# Patient Record
Sex: FEMALE | Race: BLACK OR AFRICAN AMERICAN | Employment: UNEMPLOYED | URBAN - METROPOLITAN AREA
[De-identification: names, ages, dates, MRNs, and addresses within clinical notes are randomized per-mention and may not be internally consistent; named-entity substitution may affect disease eponyms.]

---

## 2020-04-14 ENCOUNTER — TELEPHONE (OUTPATIENT)
Dept: OBGYN | Age: 30
End: 2020-04-14

## 2020-04-14 NOTE — TELEPHONE ENCOUNTER
Patient called requesting new OB appointment. Reviewed poc, COVID-19 restrictions, no show policy and directions with patient verbalizing understanding. Instructed to bring picture ID and proof of income. Referral from [facility].

## 2020-05-05 ENCOUNTER — INITIAL PRENATAL (OUTPATIENT)
Dept: OBGYN | Age: 30
End: 2020-05-05
Payer: MEDICAID

## 2020-05-05 VITALS — HEART RATE: 85 BPM | WEIGHT: 228.4 LBS | SYSTOLIC BLOOD PRESSURE: 118 MMHG | DIASTOLIC BLOOD PRESSURE: 81 MMHG

## 2020-05-05 PROBLEM — O99.210 OBESITY IN PREGNANCY, ANTEPARTUM: Status: ACTIVE | Noted: 2020-05-05

## 2020-05-05 PROBLEM — Z86.19 H/O SYPHILIS: Status: ACTIVE | Noted: 2020-05-05

## 2020-05-05 PROBLEM — O09.30 INSUFFICIENT PRENATAL CARE: Status: ACTIVE | Noted: 2020-05-05

## 2020-05-05 LAB
ABO/RH: NORMAL
AMPHETAMINE SCREEN, URINE: ABNORMAL
ANTIBODY SCREEN: NORMAL
BARBITURATE SCREEN URINE: ABNORMAL
BENZODIAZEPINE SCREEN, URINE: ABNORMAL
BILIRUBIN URINE: NEGATIVE MG/DL
BLOOD, URINE: NEGATIVE
BUPRENORPHINE URINE: ABNORMAL
CANNABINOID SCREEN URINE: POSITIVE
CLARITY: CLEAR
COCAINE METABOLITE SCREEN URINE: ABNORMAL
COLOR: YELLOW
GLUCOSE URINE: NEGATIVE MG/DL
HEPATITIS C ANTIBODY INTERPRETATION: NORMAL
KETONES, URINE: NEGATIVE MG/DL
LEUKOCYTE ESTERASE, URINE: ABNORMAL
Lab: ABNORMAL
METHADONE SCREEN, URINE: ABNORMAL
NITRITE, URINE: NEGATIVE
OPIATE SCREEN URINE: ABNORMAL
OXYCODONE URINE: ABNORMAL
PH UA: 7.5
PH UA: 7.5 (ref 5–8)
PHENCYCLIDINE SCREEN URINE: ABNORMAL
PROPOXYPHENE SCREEN: ABNORMAL
PROTEIN UA: NEGATIVE MG/DL
SPECIFIC GRAVITY UA: 1.02 (ref 1–1.03)
UROBILINOGEN, URINE: 0.2 E.U./DL

## 2020-05-05 PROCEDURE — 99204 OFFICE O/P NEW MOD 45 MIN: CPT | Performed by: OBSTETRICS & GYNECOLOGY

## 2020-05-05 PROCEDURE — 86780 TREPONEMA PALLIDUM: CPT

## 2020-05-05 PROCEDURE — 88175 CYTOPATH C/V AUTO FLUID REDO: CPT

## 2020-05-05 PROCEDURE — 86592 SYPHILIS TEST NON-TREP QUAL: CPT

## 2020-05-05 PROCEDURE — 85025 COMPLETE CBC W/AUTO DIFF WBC: CPT

## 2020-05-05 PROCEDURE — 87624 HPV HI-RISK TYP POOLED RSLT: CPT

## 2020-05-05 PROCEDURE — 80307 DRUG TEST PRSMV CHEM ANLYZR: CPT

## 2020-05-05 PROCEDURE — 86701 HIV-1ANTIBODY: CPT

## 2020-05-05 PROCEDURE — 86702 HIV-2 ANTIBODY: CPT

## 2020-05-05 PROCEDURE — 86762 RUBELLA ANTIBODY: CPT

## 2020-05-05 PROCEDURE — 87591 N.GONORRHOEAE DNA AMP PROB: CPT

## 2020-05-05 PROCEDURE — 86901 BLOOD TYPING SEROLOGIC RH(D): CPT

## 2020-05-05 PROCEDURE — 87086 URINE CULTURE/COLONY COUNT: CPT

## 2020-05-05 PROCEDURE — 86850 RBC ANTIBODY SCREEN: CPT

## 2020-05-05 PROCEDURE — 81003 URINALYSIS AUTO W/O SCOPE: CPT

## 2020-05-05 PROCEDURE — 86900 BLOOD TYPING SEROLOGIC ABO: CPT

## 2020-05-05 PROCEDURE — 87390 HIV-1 AG IA: CPT

## 2020-05-05 PROCEDURE — 86803 HEPATITIS C AB TEST: CPT

## 2020-05-05 PROCEDURE — 87340 HEPATITIS B SURFACE AG IA: CPT

## 2020-05-05 PROCEDURE — 87491 CHLMYD TRACH DNA AMP PROBE: CPT

## 2020-05-05 RX ORDER — VITAMIN A, VITAMIN C, VITAMIN D-3, VITAMIN E, VITAMIN B-1, VITAMIN B-2, NIACIN, VITAMIN B-6, CALCIUM, IRON, ZINC, COPPER 4000; 120; 400; 22; 1.84; 3; 20; 10; 1; 12; 200; 27; 25; 2 [IU]/1; MG/1; [IU]/1; MG/1; MG/1; MG/1; MG/1; MG/1; MG/1; UG/1; MG/1; MG/1; MG/1; MG/1
1 TABLET ORAL DAILY
Qty: 30 TABLET | Refills: 11 | Status: SHIPPED | OUTPATIENT
Start: 2020-05-05

## 2020-05-05 NOTE — PROGRESS NOTES
[facility] Obstetric Social History    Patient Name Vishal Campos [facility] [birth year]  Prenatal Care Began  5-5-20    Phone [phone] (home)  [facility]     Emergency Contact: Mart Doran    Phone [phone]    Marital Status: Single  x                Living Situation:  Lives with FOB    How many children do you have? 0    Attitude about pregnancy:  Surprised     Preparation for Infant arrival:  Family will purchase items    Childbirth Classes:     Parenting Classes:      Any Domestic Abuse:  Denies   Physical Abuse:  Denies   Sexual Abuse:  Denies   Substance Abuse:  Denies   History of Depression:  Denies   Other Mental Health Issues:  Denies     Education:  Senior Year of [employer]  Occupation:   in [address] [place] [address] Medicaid  has [employer]  has [employer]  Assistance  [employer]    Support System:  Large family, FOB, friends     Father of Baby:  Ariana Cary  Age:  34  Education:  11th  Occupation:  [employer]  Emotional Support:  Good  Financial Support:  good  Any other children? 3  Attitude toward Pregnancy?   Very happy   Relationship with Father of Baby:  Together four years    Patient concerns/plans for self and infant:  No concerns,    Summary:  Pt scored a 1/30,  Pt smokes but is trying to quit    Referrals Offered: WIC,  Pt [address]  Follow-up needed:      : KYLE BLACK  Date: 5/5/2020     Follow-up:

## 2020-05-06 ENCOUNTER — HOSPITAL ENCOUNTER (OUTPATIENT)
Dept: ULTRASOUND IMAGING | Age: 30
Discharge: HOME | End: 2020-05-06
Payer: MEDICAID

## 2020-05-06 LAB — URINE CULTURE, ROUTINE: NORMAL

## 2020-05-06 PROCEDURE — 76805 OB US >/= 14 WKS SNGL FETUS: CPT

## 2020-05-07 LAB
HIV AG/AB: NORMAL
HIV ANTIGEN: NORMAL
HIV-1 ANTIBODY: NORMAL
HIV-2 AB: NORMAL

## 2020-05-08 LAB
BASOPHILS ABSOLUTE: 0.1 K/UL (ref 0–0.2)
BASOPHILS RELATIVE PERCENT: 0.8 %
EOSINOPHILS ABSOLUTE: 0.1 K/UL (ref 0–0.6)
EOSINOPHILS RELATIVE PERCENT: 1.4 %
HCT VFR BLD CALC: 33.2 % (ref 36–48)
HEMOGLOBIN: 11.1 G/DL (ref 12–16)
HEPATITIS B SURFACE ANTIGEN INTERPRETATION: ABNORMAL
LYMPHOCYTES ABSOLUTE: 2.6 K/UL (ref 1–5.1)
LYMPHOCYTES RELATIVE PERCENT: 36.4 %
MCH RBC QN AUTO: 29.9 PG (ref 26–34)
MCHC RBC AUTO-ENTMCNC: 33.6 G/DL (ref 31–36)
MCV RBC AUTO: 89.2 FL (ref 80–100)
MONOCYTES ABSOLUTE: 0.6 K/UL (ref 0–1.3)
MONOCYTES RELATIVE PERCENT: 8.6 %
NEUTROPHILS ABSOLUTE: 3.8 K/UL (ref 1.7–7.7)
NEUTROPHILS RELATIVE PERCENT: 52.8 %
PDW BLD-RTO: 14.5 % (ref 12.4–15.4)
PLATELET # BLD: 288 K/UL (ref 135–450)
PMV BLD AUTO: 8.2 FL (ref 5–10.5)
RBC # BLD: 3.72 M/UL (ref 4–5.2)
RPR: REACTIVE
RUBELLA ANTIBODY IGG: 355.3 IU/ML
TOTAL SYPHILLIS IGG/IGM: REACTIVE
WBC # BLD: 7.3 K/UL (ref 4–11)

## 2020-05-09 ENCOUNTER — HOSPITAL ENCOUNTER (OUTPATIENT)
Age: 30
Setting detail: OBSERVATION
Discharge: HOME | End: 2020-05-10
Attending: EMERGENCY MEDICINE | Admitting: INTERNAL MEDICINE
Payer: MEDICAID

## 2020-05-09 PROBLEM — R29.810 FACIAL DROOP: Status: ACTIVE | Noted: 2020-05-09

## 2020-05-09 PROBLEM — Z3A.22 22 WEEKS GESTATION OF PREGNANCY: Status: ACTIVE | Noted: 2020-05-09

## 2020-05-09 LAB
ANION GAP SERPL CALCULATED.3IONS-SCNC: 11 MMOL/L (ref 3–16)
BASOPHILS ABSOLUTE: 0 K/UL (ref 0–0.2)
BASOPHILS RELATIVE PERCENT: 0.5 %
BILIRUBIN URINE: NEGATIVE
BLOOD, URINE: NEGATIVE
BUN BLDV-MCNC: 4 MG/DL (ref 7–20)
CALCIUM SERPL-MCNC: 9.2 MG/DL (ref 8.3–10.6)
CHLORIDE BLD-SCNC: 104 MMOL/L (ref 99–110)
CLARITY: CLEAR
CO2: 20 MMOL/L (ref 21–32)
COLOR: YELLOW
CREAT SERPL-MCNC: <0.5 MG/DL (ref 0.6–1.1)
EKG ATRIAL RATE: 100 BPM
EKG DIAGNOSIS: NORMAL
EKG P AXIS: 42 DEGREES
EKG P-R INTERVAL: 144 MS
EKG Q-T INTERVAL: 342 MS
EKG QRS DURATION: 72 MS
EKG QTC CALCULATION (BAZETT): 441 MS
EKG R AXIS: 9 DEGREES
EKG T AXIS: 14 DEGREES
EKG VENTRICULAR RATE: 100 BPM
EOSINOPHILS ABSOLUTE: 0.2 K/UL (ref 0–0.6)
EOSINOPHILS RELATIVE PERCENT: 2.5 %
GFR AFRICAN AMERICAN: >60
GFR NON-AFRICAN AMERICAN: >60
GLUCOSE BLD-MCNC: 91 MG/DL (ref 70–99)
GLUCOSE URINE: NEGATIVE MG/DL
HCT VFR BLD CALC: 34.4 % (ref 36–48)
HEMOGLOBIN: 11.6 G/DL (ref 12–16)
KETONES, URINE: NEGATIVE MG/DL
LEUKOCYTE ESTERASE, URINE: NEGATIVE
LYMPHOCYTES ABSOLUTE: 2.2 K/UL (ref 1–5.1)
LYMPHOCYTES RELATIVE PERCENT: 31.8 %
MCH RBC QN AUTO: 29.9 PG (ref 26–34)
MCHC RBC AUTO-ENTMCNC: 33.7 G/DL (ref 31–36)
MCV RBC AUTO: 88.8 FL (ref 80–100)
MICROSCOPIC EXAMINATION: NORMAL
MONOCYTES ABSOLUTE: 0.7 K/UL (ref 0–1.3)
MONOCYTES RELATIVE PERCENT: 9.7 %
NEUTROPHILS ABSOLUTE: 3.8 K/UL (ref 1.7–7.7)
NEUTROPHILS RELATIVE PERCENT: 55.5 %
NITRITE, URINE: NEGATIVE
PDW BLD-RTO: 14.6 % (ref 12.4–15.4)
PH UA: 7 (ref 5–8)
PLATELET # BLD: 306 K/UL (ref 135–450)
PMV BLD AUTO: 7.5 FL (ref 5–10.5)
POTASSIUM REFLEX MAGNESIUM: 3.9 MMOL/L (ref 3.5–5.1)
PROTEIN UA: NEGATIVE MG/DL
RBC # BLD: 3.87 M/UL (ref 4–5.2)
SODIUM BLD-SCNC: 135 MMOL/L (ref 136–145)
SPECIFIC GRAVITY UA: 1.02 (ref 1–1.03)
SPECIMEN STATUS: NORMAL
URINE REFLEX TO CULTURE: NORMAL
URINE TYPE: NORMAL
UROBILINOGEN, URINE: 0.2 E.U./DL
WBC # BLD: 6.9 K/UL (ref 4–11)

## 2020-05-09 PROCEDURE — G0378 HOSPITAL OBSERVATION PER HR: HCPCS

## 2020-05-09 PROCEDURE — 85025 COMPLETE CBC W/AUTO DIFF WBC: CPT

## 2020-05-09 PROCEDURE — 81003 URINALYSIS AUTO W/O SCOPE: CPT

## 2020-05-09 PROCEDURE — 80048 BASIC METABOLIC PNL TOTAL CA: CPT

## 2020-05-09 PROCEDURE — 2580000003 HC RX 258: Performed by: INTERNAL MEDICINE

## 2020-05-09 PROCEDURE — 99291 CRITICAL CARE FIRST HOUR: CPT

## 2020-05-09 PROCEDURE — 93005 ELECTROCARDIOGRAM TRACING: CPT | Performed by: EMERGENCY MEDICINE

## 2020-05-09 PROCEDURE — 93010 ELECTROCARDIOGRAM REPORT: CPT | Performed by: INTERNAL MEDICINE

## 2020-05-09 PROCEDURE — 6370000000 HC RX 637 (ALT 250 FOR IP): Performed by: INTERNAL MEDICINE

## 2020-05-09 RX ORDER — PRENATAL WITH FERROUS FUM AND FOLIC ACID 3080; 920; 120; 400; 22; 1.84; 3; 20; 10; 1; 12; 200; 27; 25; 2 [IU]/1; [IU]/1; MG/1; [IU]/1; MG/1; MG/1; MG/1; MG/1; MG/1; MG/1; UG/1; MG/1; MG/1; MG/1; MG/1
1 TABLET ORAL DAILY
Status: DISCONTINUED | OUTPATIENT
Start: 2020-05-09 | End: 2020-05-10 | Stop reason: HOSPADM

## 2020-05-09 RX ORDER — ACETAMINOPHEN 325 MG/1
650 TABLET ORAL EVERY 6 HOURS PRN
Status: DISCONTINUED | OUTPATIENT
Start: 2020-05-09 | End: 2020-05-10 | Stop reason: HOSPADM

## 2020-05-09 RX ORDER — SODIUM CHLORIDE 0.9 % (FLUSH) 0.9 %
10 SYRINGE (ML) INJECTION PRN
Status: DISCONTINUED | OUTPATIENT
Start: 2020-05-09 | End: 2020-05-10 | Stop reason: HOSPADM

## 2020-05-09 RX ORDER — SODIUM CHLORIDE 0.9 % (FLUSH) 0.9 %
10 SYRINGE (ML) INJECTION EVERY 12 HOURS SCHEDULED
Status: DISCONTINUED | OUTPATIENT
Start: 2020-05-09 | End: 2020-05-10 | Stop reason: HOSPADM

## 2020-05-09 RX ORDER — ONDANSETRON 2 MG/ML
4 INJECTION INTRAMUSCULAR; INTRAVENOUS EVERY 6 HOURS PRN
Status: DISCONTINUED | OUTPATIENT
Start: 2020-05-09 | End: 2020-05-10 | Stop reason: HOSPADM

## 2020-05-09 RX ORDER — ACETAMINOPHEN 650 MG/1
650 SUPPOSITORY RECTAL EVERY 6 HOURS PRN
Status: DISCONTINUED | OUTPATIENT
Start: 2020-05-09 | End: 2020-05-10 | Stop reason: HOSPADM

## 2020-05-09 RX ADMIN — Medication 1 TABLET: at 16:39

## 2020-05-09 RX ADMIN — Medication 10 ML: at 20:05

## 2020-05-09 ASSESSMENT — PAIN SCALES - GENERAL: PAINLEVEL_OUTOF10: 0

## 2020-05-09 NOTE — ED NOTES
Called Dr. Cinthya Alva @[phone]  RE: admission per Dr. Reese Bakari called back @[phone] [facility]  05/09/20 [id number]

## 2020-05-09 NOTE — ED PROVIDER NOTES
nerve palsy in addition to facial numbness and slurred speech. Symptoms seem to have began about 2 days ago with numbness of the tongue making patient in eligible for TPA. Discussed with Dr. Marisela Tierney from  stroke team who recommended close observation for the patient given she is at high risk of ischemic events. He recommended against obtaining CT scans at this time. Discussed this with the patient and she is in agreement. (EMP MDM)    Discussed with Dr. Maribel Brown from OBGYN who feels patient would be more appropriate for internal medicine admission. Will call hospitalist.     The total critical care time spent while evaluating and treating this patient was at least 40 minutes. This excludes time spent doing separately billable procedures. This includes time at the bedside, data interpretation, medication management, obtaining critical history from collateral sources if the patient is unable to provide it directly, and physician consultation. Specifics of interventions taken and potentially life-threatening diagnostic considerations are listed above in the medical decision making. [unfilled]    Final Impression  1. Slurred speech    2. Facial palsy    3. Facial numbness        Blood pressure 118/77, pulse 95, temperature 98.6 °F (37 °C), temperature source Oral, resp. rate 23, weight 228 lb 4 oz (103.5 kg), last menstrual period 12/03/2019, SpO2 100 %. Disposition:  DISPOSITION Decision To Admit 05/09/2020 01:25:35 PM      Patient Referrals:  No follow-up provider specified. Discharge Medications:  New Prescriptions    No medications on file       Discontinued Medications:  Discontinued Medications    No medications on file       This chart was generated using the [address] [place] [address] dictation system. I created this record but it may contain dictation errors given the limitations of this technology.         Rashaun Duarte MD  05/09/20 4652 Daniel Oquendo MD  05/09/20 6663
No

## 2020-05-10 ENCOUNTER — APPOINTMENT (OUTPATIENT)
Dept: MRI IMAGING | Age: 30
End: 2020-05-10
Payer: MEDICAID

## 2020-05-10 VITALS
SYSTOLIC BLOOD PRESSURE: 121 MMHG | WEIGHT: 225.3 LBS | HEART RATE: 96 BPM | OXYGEN SATURATION: 99 % | DIASTOLIC BLOOD PRESSURE: 76 MMHG | TEMPERATURE: 98.3 F | HEIGHT: 66 IN | BODY MASS INDEX: 36.21 KG/M2 | RESPIRATION RATE: 16 BRPM

## 2020-05-10 LAB
CHOLESTEROL, TOTAL: 110 MG/DL (ref 0–199)
HDLC SERPL-MCNC: 54 MG/DL (ref 40–60)
LDL CHOLESTEROL CALCULATED: 40 MG/DL
TRIGL SERPL-MCNC: 80 MG/DL (ref 0–150)
VLDLC SERPL CALC-MCNC: 16 MG/DL

## 2020-05-10 PROCEDURE — 80061 LIPID PANEL: CPT

## 2020-05-10 PROCEDURE — 6370000000 HC RX 637 (ALT 250 FOR IP): Performed by: INTERNAL MEDICINE

## 2020-05-10 PROCEDURE — G0378 HOSPITAL OBSERVATION PER HR: HCPCS

## 2020-05-10 PROCEDURE — 99218 PR INITIAL OBSERVATION CARE/DAY 30 MINUTES: CPT | Performed by: PSYCHIATRY & NEUROLOGY

## 2020-05-10 PROCEDURE — 70551 MRI BRAIN STEM W/O DYE: CPT

## 2020-05-10 PROCEDURE — 36415 COLL VENOUS BLD VENIPUNCTURE: CPT

## 2020-05-10 RX ORDER — METHYLPREDNISOLONE 4 MG/1
4 TABLET ORAL SEE ADMIN INSTRUCTIONS
Qty: 1 KIT | Refills: 0 | Status: ON HOLD | OUTPATIENT
Start: 2020-05-10 | End: 2020-09-12 | Stop reason: HOSPADM

## 2020-05-10 RX ADMIN — Medication 1 TABLET: at 12:35

## 2020-05-10 ASSESSMENT — PAIN SCALES - GENERAL: PAINLEVEL_OUTOF10: 0

## 2020-05-10 NOTE — PROGRESS NOTES
4 Eyes Skin Assessment     The patient is being assess for   Admission    I agree that 2 RN's have performed a thorough Head to Toe Skin Assessment on the patient. ALL assessment sites listed below have been assessed. Areas assessed by both nurses:   [x]   Head, Face, and Ears   [x]   Shoulders, Back, and Chest, Abdomen  [x]   Arms, Elbows, and Hands   [x]   Coccyx, Sacrum, and Ischium  [x]   Legs, Feet, and Heels         no issues     **SHARE this note so that the co-signing nurse is able to place an eSignature**    Co-signer eSignature: Electronically signed by Walter Monique RN on 5/9/20 at 4:23 PM EDT    Does the Patient have Skin Breakdown?   No          [place] Prevention initiated:  No   Wound Care Orders initiated:  No      [facility] nurse consulted for Pressure Injury (Stage 3,4, Unstageable, DTI, NWPT, Complex wounds)and New or Established Ostomies:  No      Primary Nurse eSignature: Electronically signed by Funmi Parker RN on 5/9/20 at 4:22 PM EDT
Patient educated on discharge instructions. Patient verbalized understanding. IV removed.
distention. Trachea midline. Respiratory:  Normal respiratory effort. Clear to auscultation, bilaterally without Rales/Wheezes/Rhonchi. Cardiovascular:  Regular rate and rhythm with normal S1/S2 without murmurs, rubs or gallops. Abdomen: Soft, non-tender, non-distended with normal bowel sounds. Musculoskeletal:  No clubbing, cyanosis or edema bilaterally. Full range of motion without deformity. Skin: Skin color, texture, turgor normal.  No rashes or lesions. Neurologic:  Neurovascularly intact without any focal sensory/motor deficits. Cranial nerves: II-XII intact, grossly non-focal. Minimal left facial droop, difficulty closing left eye, decreased sensation on v1/2/v3 areas  Psychiatric:  Alert and oriented, thought content appropriate, normal insight  Capillary Refill: Brisk,< 3 seconds   Peripheral Pulses: +2 palpable, equal bilaterally     Assessment/Plan:    Active Hospital Problems    Diagnosis    Bell palsy [G51.0]    Slurred speech [R47.81]    Facial droop [R29.810]    22 weeks gestation of pregnancy [Z3A.22]     Acute Left facial weakness, likely Bell's Palsy: Brain MRI limited but negative for ischemia. Clinical symptoms/progression consistent with Bell's Palsy. Appreciate Neurology input. No role for steroids or anti-viral at this point. Continue symptomatic care with eye drops and eye patch at night. Tobacco use - ongoing, pt has cut down dramatically to 1-2 cigarettes daily   -counselled to completely stop    Ongoing pregnancy- 22 weeks.    -Management per OB/GYN    DVT Prophylaxis: SCD for now  Diet: DIET GENERAL;  Code Status: Full Code  PT/OT Eval Status: NA    Dispo - OK for discharge today per primary team    Lourdes Boles MD

## 2020-05-10 NOTE — DISCHARGE SUMMARY
Physician Discharge Summary     Patient ID:  Mattie Venegas  [id number]  46 y.o.  1990    Admit date: 5/9/2020    Discharge date and time:  5/10/20 @ 18:00    Admitting Physician: Vincent Jorgensen MD     Discharge Physician: Nicole Gonsales     Admission Diagnoses: Facial droop [R29.810]    Discharge Diagnoses: Bell's Palsy; 22 wk. gestation    Admission Condition: good    Discharged Condition: good    Indication for Admission: evaluation of stroke vs. Bell's palsy    Hospital Course: uncomplicated    Consults: Neurology, OB/GYN,   Significant Diagnostic Studies:   EXAMINATION:   MRI OF THE BRAIN WITHOUT CONTRAST,  5/10/2020 10:34 am       TECHNIQUE:   Multiplanar multisequence MRI of the brain was performed without the   administration of intravenous contrast.       COMPARISON:   None       HISTORY:   ORDERING SYSTEM PROVIDED HISTORY: Left facial droop, ? TIA vs palsy. TECHNOLOGIST PROVIDED HISTORY:   Reason for exam:->Left facial droop, ? TIA vs palsy. Is the patient pregnant? ->Yes       FINDINGS:   The examination is incomplete.  The patient was only able to complete the   sagittal T1 and fusion images.  The sagittal T1 images are motion degraded.       There is no acute infarct, herniation, or hydrocephalus.           Impression   No acute infarct.       Incomplete MRI. Treatments: IV hydration, swallow study    Discharge Exam:  S: Patient feeling better. Reports able to drink & eat w/o difficulty. Reports able to move left side of face more than yesterday. Denies LOF, VB or contractions. +FM. O:  Vitals:    05/09/20 2304 05/10/20 0336 05/10/20 0714 05/10/20 1509   BP: 115/79 97/60 105/70 121/76   Pulse: 96 98 104 96   Resp: 16 16 16 16   Temp: 97.9 °F (36.6 °C) 98.8 °F (37.1 °C) 98.1 °F (36.7 °C) 98.3 °F (36.8 °C)   TempSrc: Oral Oral Oral Oral   SpO2: 100% 98% 98% 99%   Weight:       Height:       Face:  Left facial dropping, infrequent eye blinking, unable to lift eyebrows up.  Weak

## 2020-05-10 NOTE — H&P
tablet, Take 1 tablet by mouth 2 times daily (with meals)  Allergies:  Shellfish-derived products and Strawberry extract    REVIEW OF SYSTEMS:    Patient has no history of depression.   Patient has no symptoms of depression  CONSTITUTIONAL:  negative for  fevers, chills and sweats  RESPIRATORY:  negative  CARDIOVASCULAR:  negative  GASTROINTESTINAL:  negative  MUSCULOSKELETAL:  Negative except as noted above with facial symptoms    PHYSICAL EXAM:  Vitals:    05/09/20 1512 05/09/20 1530 05/09/20 1604 05/09/20 1856   BP: 116/84 130/87  112/80   Pulse: 101 91  96   Resp: 16 20  18   Temp:  98.4 °F (36.9 °C)  98.2 °F (36.8 °C)   TempSrc:  Oral  Oral   SpO2: 100% 100%  99%   Weight:   225 lb 4.8 oz (102.2 kg)    Height:   5' 6\" (1.676 m)    General appearance:  awake, alert, cooperative, no apparent distress, and appears stated age, left sided facial droop  Lungs:  CTA b/l  Heart: RRR  Abdomen:  Obese, soft, NT, ND, +BS  EXT: no c/c/e    Fetal heart rate:  Baseline Heart Rate 155 bpm with doppler        General Labs:    5/5/2020  6:33 PM - Laverna Blanks Incoming Lab Results From Soft (Epic Adt)     Component Collected Lab   ABO/Rh 05/05/2020  3:42 PM [address] Lab   B POS    Antibody Screen 05/05/2020  3:42 PM [address] Lab   NEG      5/9/2020  2:03 PM - Swoh Incoming Lab Results From Soft (Epic Adt)     Specimen Information: Urine, clean catch        Component Value Ref Range & Units Status Collected Lab   Color, UA Yellow  Straw/Yellow Final 05/09/2020 12:49 PM [address] [place] [address] Clear  Clear Final 05/09/2020 12:49 PM [address] Lab   Glucose, Ur Negative  Negative mg/dL Final 05/09/2020 12:49 PM [address] Lab   Bilirubin Urine Negative  Negative Final 05/09/2020 12:49 PM [address] Lab   Ketones, Urine Negative  Negative mg/dL Final 05/09/2020 12:49 PM [address] Lab   Specific [place], UA 1.025  1.005 - 1.030 Final 05/09/2020 12:49 PM [address] [facility] Lab   Blood, Urine Negative  Negative Final 05/09/2020 12:49 PM [address] [facility] Lab   pH, UA 7.0  5.0 - 8.0 Final 05/09/2020 12:49 PM [address] Lab   Protein, UA Negative  Negative mg/dL Final 05/09/2020 12:49 PM [address] [facility] Lab   Urobilinogen, Urine 0.2  <2.0 E.U./dL Final 05/09/2020 12:49 PM [address] Lab   Nitrite, Urine Negative  Negative Final 05/09/2020 12:49 PM [address] [facility] Lab   Leukocyte Esterase, Urine Negative  Negative Final 05/09/2020 12:49 PM [address] [facility] Lab   Microscopic Examination Not Indicated   Final 05/09/2020 12:49 PM [address] [facility] Lab   Urine Type NotGiven   Final 05/09/2020 12:49 PM [address] [facility]   Urine Reflex to Culture Not Indicated   Final 05/09/2020 12:49 PM [address] [facility]     5/9/2020  1:15 PM - Nova Drake Lab Results From Soft (Epic Adt)     Component Value Ref Range & Units Status Collected Lab   Sodium 135Low   136 - 145 mmol/L Final 05/09/2020 12:49 PM [address] [facility] Lab   Potassium reflex Magnesium 3.9  3.5 - 5.1 mmol/L Final 05/09/2020 12:49 PM [address] [facility] Lab   Chloride 104  99 - 110 mmol/L Final 05/09/2020 12:49 PM [address] [facility] Lab   CO2 20Low   21 - 32 mmol/L Final 05/09/2020 12:49 PM [address] [facility]   Anion Gap 11  3 - 16 Final 05/09/2020 12:49 PM [address] [facility]   Glucose 91  70 - 99 mg/dL Final 05/09/2020 12:49 PM [address] [facility]   BUN 4Low   7 - 20 mg/dL Final 05/09/2020 12:49 PM [address] [facility] <0.5Low   0.6 - 1.1 mg/dL Final 05/09/2020 12:49 PM [address] [facility] Lab   GFR Non-African American >60  >60 Final 05/09/2020 12:49 PM [address] [facility] Lab   >60 mL/min/1.73m2 EGFR, calc. for ages 25 and older using the   MDRD formula (not corrected for weight), is valid for stable   renal function.     GFR  >60  >60 Final 05/09/2020 12:49 PM [address] Lab Final 05/09/2020 12:49 PM [address] Lab   Monocytes Absolute 0.7  0.0 - 1.3 K/uL Final 05/09/2020 12:49 PM [address] Lab   Eosinophils Absolute 0.2  0.0 - 0.6 K/uL Final 05/09/2020 12:49 PM [address] Lab   Basophils Absolute 0.0  0.0 - 0.2 K/uL Final 05/09/2020 12:49 PM [address] Lab     EXAMINATION:   TRANSABDOMINAL SECOND/THIRD TRIMESTER OBSTETRIC PELVIC ULTRASOUND WITH COLOR   DOPPLER FLOW       5/6/2020 12:55 pm       TECHNIQUE:   TRANSABDOMINAL PELVIC ULTRASOUND WITH COLOR DOPPLER FLOW       COMPARISON:   None.       HISTORY:   ORDERING SYSTEM PROVIDED HISTORY: Encounter for supervision of normal first   pregnancy in second trimester   TECHNOLOGIST PROVIDED HISTORY:   Reason for exam:->size dates anatomy lmp 12/3/2019   Reason for Exam: size/dates/anatomy   Acuity: Acute   Type of Exam: Initial   Additional signs and symptoms: na   Relevant Medical/Surgical History: na       Estimated gestational age 25 weeks 1 day.       FINDINGS:       GENERAL OBSERVATIONS:       PREGNANCY:   Single       CARDIAC ACTIVITY:  Yes       FETAL HEART RATE: 140 beats per minute       FETAL BODY & LIMB MOVEMENTS:  Yes       FETAL POSITION:  Cephalic       PLACENTA LOCATION:  Anterior       WILFREDO:   88.4 cm which lies at the 75th percentile for gestational age.           FETAL ANATOMY:       CEREBRAL VENTRICLES:  Normal       CHOROID PLEXUS:  Normal       CEREBELLI:  Normal       POSTERIOR FOSSA:  Normal       UPPER LIP/FACE: Normal       4-CHAMBER HEART:  Normal       OUTFLOW TRACTS: Normal       STOMACH:  Normal       KIDNEYS:  Normal       CORD INSERTION:  Normal       3-VESSEL CORD:  Normal       URINARY BLADDER:  Normal       SPINE:  Normal       4 LIMBS: Normal           ESTIMATED FETAL AGE:       BY LMP:  22 weeks 1 day       CURRENT US:  22 weeks 6 days       ESTIMATED FETAL WEIGHT:   519 grams, 55%tile by LMP, 44%tile by AUA           MEASUREMENTS:       BPD: 5.6 cm, 84%tile

## 2020-05-10 NOTE — FLOWSHEET NOTE
This RN to bedside to obtain FHT's; doppler detected same in LLQ @ rate 160. Obvious fetal movement noted and pt reports feeling same. Pt reassured by listening to FHT's.

## 2020-05-10 NOTE — CONSULTS
Dosepak or acyclovir at this point. I think the risk outweighs the benefit. Pregnancy    Recommendation:  Long discussion with the patient regarding outcome from Bell's palsy  No need to start aspirin  Artificial eye tears and eye patch at night  Continue current supportive care for her pregnancy  Discussed outcome from Bell's palsy and prognosis  Can be discharged from neurology  No further recommendation      Thank you for referring such patient. If you have any questions regarding my consult note, please don't hesitate to call me. Lb Davidson MD  [phone]    This dictation was generated by voice recognition computer software.  Although all attempts are made to edit the dictation for accuracy, there may be errors in the  transcription that are not intended

## 2020-05-12 LAB
HPV COMMENT: NORMAL
HPV TYPE 16: NOT DETECTED
HPV TYPE 18: NOT DETECTED
HPVOH (OTHER TYPES): NOT DETECTED
Lab: NORMAL
REPORT: NORMAL
THIS TEST SENT TO: NORMAL

## 2020-05-13 PROBLEM — R87.610 ASCUS OF CERVIX WITH NEGATIVE HIGH RISK HPV: Status: ACTIVE | Noted: 2020-05-13

## 2020-05-13 LAB
C. TRACHOMATIS DNA,THIN PREP: NEGATIVE
N. GONORRHOEAE DNA, THIN PREP: NEGATIVE

## 2020-05-27 LAB
ABO, EXTERNAL RESULT: NORMAL
C. TRACHOMATIS, EXTERNAL RESULT: NEGATIVE
HEP B, EXTERNAL RESULT: NEGATIVE
HIV, EXTERNAL RESULT: NEGATIVE
N. GONORRHOEAE, EXTERNAL RESULT: NEGATIVE
RH FACTOR, EXTERNAL RESULT: POSITIVE
RPR, EXTERNAL RESULT: REACTIVE
RUBELLA TITER, EXTERNAL RESULT: NORMAL

## 2020-06-18 ENCOUNTER — TELEPHONE (OUTPATIENT)
Dept: OBGYN | Age: 30
End: 2020-06-18

## 2020-08-12 LAB — GBS, EXTERNAL RESULT: NEGATIVE

## 2020-09-04 ENCOUNTER — OFFICE VISIT (OUTPATIENT)
Dept: PRIMARY CARE CLINIC | Age: 30
End: 2020-09-04
Payer: MEDICAID

## 2020-09-04 PROCEDURE — 99211 OFF/OP EST MAY X REQ PHY/QHP: CPT | Performed by: NURSE PRACTITIONER

## 2020-09-04 PROCEDURE — G8419 CALC BMI OUT NRM PARAM NOF/U: HCPCS | Performed by: NURSE PRACTITIONER

## 2020-09-04 PROCEDURE — G8428 CUR MEDS NOT DOCUMENT: HCPCS | Performed by: NURSE PRACTITIONER

## 2020-09-04 NOTE — PROGRESS NOTES
Kasia Raya received a viral test for COVID-19. They were educated on isolation and quarantine as appropriate. For any symptoms, they were directed to seek care from their PCP, given contact information to establish with a doctor, directed to an urgent care or the emergency room.

## 2020-09-04 NOTE — PATIENT INSTRUCTIONS
Advance Care Planning  People with COVID-19 may have no symptoms, mild symptoms, such as fever, cough, and shortness of breath or they may have more severe illness, developing severe and fatal pneumonia. As a result, Advance Care Planning with attention to naming a health care decision maker (someone you trust to make healthcare decisions for you if you could not speak for yourself) and sharing other health care preferences is important BEFORE a possible health crisis. Please contact your Primary Care Provider to discuss Advance Care Planning. Preventing the Spread of Coronavirus Disease 2019 in Homes and Residential Communities  For the most recent information go to [email or web address]    Prevention steps for People with confirmed or suspected COVID-19 (including persons under investigation) who do not need to be hospitalized  and   People with confirmed COVID-19 who were hospitalized and determined to be medically stable to go home    Your healthcare provider and public health staff will evaluate whether you can be cared for at home. If it is determined that you do not need to be hospitalized and can be isolated at home, you will be monitored by staff from your local or state health department. You should follow the prevention steps below until a healthcare provider or local or state health department says you can return to your normal activities. Stay home except to get medical care  People who are mildly ill with COVID-19 are able to isolate at home during their illness. You should restrict activities outside your home, except for getting medical care. Do not go to work, school, or public areas. Avoid using public transportation, ride-sharing, or taxis. Separate yourself from other people and animals in your home  People: As much as possible, you should stay in a specific room and away from other people in your home.  Also, you should use a separate before eating or preparing food. If soap and water are not readily available, use an alcohol-based hand  with at least 60% alcohol, covering all surfaces of your hands and rubbing them together until they feel dry. Soap and water are the best option if hands are visibly dirty. Avoid touching your eyes, nose, and mouth with unwashed hands. Avoid sharing personal household items  You should not share dishes, drinking glasses, cups, eating utensils, towels, or bedding with other people or pets in your home. After using these items, they should be washed thoroughly with soap and water. Clean all high-touch surfaces everyday  High touch surfaces include counters, tabletops, doorknobs, bathroom fixtures, toilets, phones, keyboards, tablets, and bedside tables. Also, clean any surfaces that may have blood, stool, or body fluids on them. Use a household cleaning spray or wipe, according to the label instructions. Labels contain instructions for safe and effective use of the cleaning product including precautions you should take when applying the product, such as wearing gloves and making sure you have good ventilation during use of the product. Monitor your symptoms  Seek prompt medical attention if your illness is worsening (e.g., difficulty breathing). Before seeking care, call your healthcare provider and tell them that you have, or are being evaluated for, COVID-19. Put on a facemask before you enter the facility. These steps will help the healthcare providers office to keep other people in the office or waiting room from getting infected or exposed. Ask your healthcare provider to call the local or state health department. Persons who are placed under active monitoring or facilitated self-monitoring should follow instructions provided by their local health department or occupational health professionals, as appropriate. When working with your local health department check their available hours.   If you have a medical emergency and need to call 911, notify the dispatch personnel that you have, or are being evaluated for COVID-19. If possible, put on a facemask before emergency medical services arrive. Discontinuing home isolation  Patients with confirmed COVID-19 should remain under home isolation precautions until the risk of secondary transmission to others is thought to be low. The decision to discontinue home isolation precautions should be made on a case-by-case basis, in consultation with healthcare providers and state and local health departments.

## 2020-09-05 LAB — SARS-COV-2, NAA: NOT DETECTED

## 2020-09-08 ENCOUNTER — PREP FOR PROCEDURE (OUTPATIENT)
Dept: OBGYN | Age: 30
End: 2020-09-08

## 2020-09-08 RX ORDER — ACETAMINOPHEN 325 MG/1
650 TABLET ORAL EVERY 4 HOURS PRN
Status: CANCELLED | OUTPATIENT
Start: 2020-09-08

## 2020-09-08 RX ORDER — DIPHENHYDRAMINE HYDROCHLORIDE 50 MG/ML
25 INJECTION INTRAMUSCULAR; INTRAVENOUS EVERY 4 HOURS PRN
Status: CANCELLED | OUTPATIENT
Start: 2020-09-08

## 2020-09-08 RX ORDER — LIDOCAINE HYDROCHLORIDE 10 MG/ML
30 INJECTION, SOLUTION EPIDURAL; INFILTRATION; INTRACAUDAL; PERINEURAL PRN
Status: CANCELLED | OUTPATIENT
Start: 2020-09-08

## 2020-09-08 RX ORDER — SODIUM CHLORIDE 0.9 % (FLUSH) 0.9 %
10 SYRINGE (ML) INJECTION PRN
Status: CANCELLED | OUTPATIENT
Start: 2020-09-08

## 2020-09-08 RX ORDER — SODIUM CHLORIDE, SODIUM LACTATE, POTASSIUM CHLORIDE, CALCIUM CHLORIDE 600; 310; 30; 20 MG/100ML; MG/100ML; MG/100ML; MG/100ML
INJECTION, SOLUTION INTRAVENOUS CONTINUOUS
Status: CANCELLED | OUTPATIENT
Start: 2020-09-08

## 2020-09-08 RX ORDER — SODIUM CHLORIDE 0.9 % (FLUSH) 0.9 %
10 SYRINGE (ML) INJECTION EVERY 12 HOURS SCHEDULED
Status: CANCELLED | OUTPATIENT
Start: 2020-09-08

## 2020-09-08 RX ORDER — ONDANSETRON 2 MG/ML
4 INJECTION INTRAMUSCULAR; INTRAVENOUS EVERY 6 HOURS PRN
Status: CANCELLED | OUTPATIENT
Start: 2020-09-08

## 2020-09-08 NOTE — RESULT ENCOUNTER NOTE
Your test for COVID-19, also known as novel coronavirus, came back negative. No virus was detected from the sample collected. Testing is not 100%. Until your symptoms are fully resolved, you may still be contagious. We recommend that you remain isolated for 7 days minimum or 72 hours after your symptoms have completely resolved, whichever is longer. If you were exposed to a known positive COVID-19 patient, then you must remain isolated for 14 days. If you were tested for a pre-op, then you remain in isolated until your procedure. Continually monitor symptoms. Contact a medical provider if symptoms are worsening. If you have any additional questions, contact your PCP.     For additional information, please visit the Centers for Disease Control and Prevention [employer] 18.5

## 2020-09-09 ENCOUNTER — HOSPITAL ENCOUNTER (INPATIENT)
Age: 30
LOS: 3 days | Discharge: HOME | DRG: 540 | End: 2020-09-12
Attending: OBSTETRICS & GYNECOLOGY | Admitting: OBSTETRICS & GYNECOLOGY
Payer: MEDICAID

## 2020-09-09 ENCOUNTER — APPOINTMENT (OUTPATIENT)
Dept: LABOR AND DELIVERY | Age: 30
DRG: 540 | End: 2020-09-09
Payer: MEDICAID

## 2020-09-09 PROBLEM — Z34.90 TERM PREGNANCY: Status: ACTIVE | Noted: 2020-09-09

## 2020-09-09 PROBLEM — Z37.9 NORMAL LABOR: Status: ACTIVE | Noted: 2020-09-09

## 2020-09-09 LAB
ABO/RH: NORMAL
AMPHETAMINE SCREEN, URINE: NORMAL
ANTIBODY SCREEN: NORMAL
BARBITURATE SCREEN URINE: NORMAL
BASOPHILS ABSOLUTE: 0 K/UL (ref 0–0.2)
BASOPHILS RELATIVE PERCENT: 0.7 %
BENZODIAZEPINE SCREEN, URINE: NORMAL
BUPRENORPHINE URINE: NORMAL
CANNABINOID SCREEN URINE: NORMAL
COCAINE METABOLITE SCREEN URINE: NORMAL
EOSINOPHILS ABSOLUTE: 0.2 K/UL (ref 0–0.6)
EOSINOPHILS RELATIVE PERCENT: 2.7 %
HCT VFR BLD CALC: 37.4 % (ref 36–48)
HEMOGLOBIN: 12.4 G/DL (ref 12–16)
LYMPHOCYTES ABSOLUTE: 2.4 K/UL (ref 1–5.1)
LYMPHOCYTES RELATIVE PERCENT: 34.5 %
Lab: NORMAL
MCH RBC QN AUTO: 29.6 PG (ref 26–34)
MCHC RBC AUTO-ENTMCNC: 33.2 G/DL (ref 31–36)
MCV RBC AUTO: 89.2 FL (ref 80–100)
METHADONE SCREEN, URINE: NORMAL
MONOCYTES ABSOLUTE: 0.9 K/UL (ref 0–1.3)
MONOCYTES RELATIVE PERCENT: 12.2 %
NEUTROPHILS ABSOLUTE: 3.5 K/UL (ref 1.7–7.7)
NEUTROPHILS RELATIVE PERCENT: 49.9 %
OPIATE SCREEN URINE: NORMAL
OXYCODONE URINE: NORMAL
PDW BLD-RTO: 13.8 % (ref 12.4–15.4)
PH UA: 7
PHENCYCLIDINE SCREEN URINE: NORMAL
PLATELET # BLD: 275 K/UL (ref 135–450)
PMV BLD AUTO: 8.3 FL (ref 5–10.5)
PROPOXYPHENE SCREEN: NORMAL
RBC # BLD: 4.2 M/UL (ref 4–5.2)
WBC # BLD: 7 K/UL (ref 4–11)

## 2020-09-09 PROCEDURE — 86850 RBC ANTIBODY SCREEN: CPT

## 2020-09-09 PROCEDURE — 86901 BLOOD TYPING SEROLOGIC RH(D): CPT

## 2020-09-09 PROCEDURE — 3E0P7VZ INTRODUCTION OF HORMONE INTO FEMALE REPRODUCTIVE, VIA NATURAL OR ARTIFICIAL OPENING: ICD-10-PCS | Performed by: OBSTETRICS & GYNECOLOGY

## 2020-09-09 PROCEDURE — 6360000002 HC RX W HCPCS: Performed by: OBSTETRICS & GYNECOLOGY

## 2020-09-09 PROCEDURE — 1220000000 HC SEMI PRIVATE OB R&B

## 2020-09-09 PROCEDURE — 85025 COMPLETE CBC W/AUTO DIFF WBC: CPT

## 2020-09-09 PROCEDURE — 80307 DRUG TEST PRSMV CHEM ANLYZR: CPT

## 2020-09-09 PROCEDURE — 86780 TREPONEMA PALLIDUM: CPT

## 2020-09-09 PROCEDURE — 86592 SYPHILIS TEST NON-TREP QUAL: CPT

## 2020-09-09 PROCEDURE — 86900 BLOOD TYPING SEROLOGIC ABO: CPT

## 2020-09-09 PROCEDURE — 2580000003 HC RX 258: Performed by: OBSTETRICS & GYNECOLOGY

## 2020-09-09 RX ORDER — ACETAMINOPHEN 325 MG/1
650 TABLET ORAL EVERY 4 HOURS PRN
Status: DISCONTINUED | OUTPATIENT
Start: 2020-09-09 | End: 2020-09-10

## 2020-09-09 RX ORDER — LIDOCAINE HYDROCHLORIDE 10 MG/ML
30 INJECTION, SOLUTION EPIDURAL; INFILTRATION; INTRACAUDAL; PERINEURAL PRN
Status: DISCONTINUED | OUTPATIENT
Start: 2020-09-09 | End: 2020-09-10

## 2020-09-09 RX ORDER — ONDANSETRON 2 MG/ML
4 INJECTION INTRAMUSCULAR; INTRAVENOUS EVERY 6 HOURS PRN
Status: DISCONTINUED | OUTPATIENT
Start: 2020-09-09 | End: 2020-09-10

## 2020-09-09 RX ORDER — SODIUM CHLORIDE 0.9 % (FLUSH) 0.9 %
10 SYRINGE (ML) INJECTION EVERY 12 HOURS SCHEDULED
Status: DISCONTINUED | OUTPATIENT
Start: 2020-09-09 | End: 2020-09-10

## 2020-09-09 RX ORDER — DIPHENHYDRAMINE HYDROCHLORIDE 50 MG/ML
25 INJECTION INTRAMUSCULAR; INTRAVENOUS EVERY 4 HOURS PRN
Status: DISCONTINUED | OUTPATIENT
Start: 2020-09-09 | End: 2020-09-10

## 2020-09-09 RX ORDER — ASPIRIN 81 MG/1
TABLET, COATED ORAL
Status: ON HOLD | COMMUNITY
Start: 2020-06-19 | End: 2020-09-12 | Stop reason: HOSPADM

## 2020-09-09 RX ORDER — SODIUM CHLORIDE, SODIUM LACTATE, POTASSIUM CHLORIDE, CALCIUM CHLORIDE 600; 310; 30; 20 MG/100ML; MG/100ML; MG/100ML; MG/100ML
INJECTION, SOLUTION INTRAVENOUS CONTINUOUS
Status: DISCONTINUED | OUTPATIENT
Start: 2020-09-09 | End: 2020-09-10

## 2020-09-09 RX ORDER — SODIUM CHLORIDE 0.9 % (FLUSH) 0.9 %
10 SYRINGE (ML) INJECTION PRN
Status: DISCONTINUED | OUTPATIENT
Start: 2020-09-09 | End: 2020-09-10

## 2020-09-09 RX ADMIN — Medication 1 MILLI-UNITS/MIN: at 20:30

## 2020-09-09 RX ADMIN — SODIUM CHLORIDE, POTASSIUM CHLORIDE, SODIUM LACTATE AND CALCIUM CHLORIDE: 600; 310; 30; 20 INJECTION, SOLUTION INTRAVENOUS at 18:47

## 2020-09-09 NOTE — PLAN OF CARE
Problem: Anxiety:  Goal: Level of anxiety will decrease  Description: Level of anxiety will decrease  Outcome: Ongoing     Problem: Breathing Pattern - Ineffective:  Goal: Able to breathe comfortably  Description: Able to breathe comfortably  Outcome: Ongoing     Problem: Fluid Volume - Imbalance:  Goal: Absence of imbalanced fluid volume signs and symptoms  Description: Absence of imbalanced fluid volume signs and symptoms  Outcome: Ongoing  Goal: Absence of intrapartum hemorrhage signs and symptoms  Description: Absence of intrapartum hemorrhage signs and symptoms  Outcome: Ongoing     Problem: Infection - Intrapartum Infection:  Goal: Will show no infection signs and symptoms  Description: Will show no infection signs and symptoms  Outcome: Ongoing     Problem: Labor Process - Prolonged:  Goal: Labor progression, first stage, within specified pattern  Description: Labor progression, first stage, within specified pattern  Outcome: Ongoing  Goal: Labor progession, second stage, within specified pattern  Description: Labor progession, second stage, within specified pattern  Outcome: Ongoing  Goal: Uterine contractions within specified parameters  Description: Uterine contractions within specified parameters  Outcome: Ongoing     Problem:  Screening:  Goal: Ability to make informed decisions regarding treatment has improved  Description: Ability to make informed decisions regarding treatment has improved  Outcome: Ongoing     Problem: Pain - Acute:  Goal: Pain level will decrease  Description: Pain level will decrease  Outcome: Ongoing  Goal: Able to cope with pain  Description: Able to cope with pain  Outcome: Ongoing     Problem: Tissue Perfusion - Uteroplacental, Altered:  Goal: Absence of abnormal fetal heart rate pattern  Description: Absence of abnormal fetal heart rate pattern  Outcome: Ongoing     Problem: Urinary Retention:  Goal: Experiences of bladder distention will decrease  Description: Experiences of bladder distention will decrease  Outcome: Ongoing  Goal: Urinary elimination within specified parameters  Description: Urinary elimination within specified parameters  Outcome: Ongoing

## 2020-09-09 NOTE — H&P
for  fevers and sweats  RESPIRATORY:  negative for  dry cough and dyspnea  CARDIOVASCULAR:  negative for  chest pain, palpitations  GASTROINTESTINAL:  negative for nausea, vomiting and change in bowel habits  GENITOURINARY:  negative for frequency and dysuria  INTEGUMENT/BREAST:  negative for rash  MUSCULOSKELETAL:  negative for  myalgias and arthralgias    PHYSICAL EXAM:    Vitals:    09/09/20 1839 09/09/20 1854   BP: 122/80    Pulse: 106    Weight:  243 lb (110.2 kg)   Height:  5' 6\" (1.676 m)     CONSTITUTIONAL:  awake, alert, cooperative, no apparent distress, and appears stated age  ABDOMEN:  Soft non tender  MUSCULOSKELETAL:  Full range of motion  Fetal heart rate:  Baseline Heart Rate 140 mod chasity, +acel, -decel  Cervix: 1/80/-2 ornelas bulb placed and inflated to 50cc  Contraction frequency:  5 minutes  Membranes:  Intact      ASSESSMENT AND PLAN:    The patient is a 26 y.o. G1 at 40.1 for IOL    Cervical ripening with ornelas bulb and pitocin  Fetal wellbeing reassuring  GBS neg    Rena Jacinto MD

## 2020-09-10 ENCOUNTER — ANESTHESIA (OUTPATIENT)
Dept: LABOR AND DELIVERY | Age: 30
DRG: 540 | End: 2020-09-10
Payer: MEDICAID

## 2020-09-10 ENCOUNTER — ANESTHESIA EVENT (OUTPATIENT)
Dept: LABOR AND DELIVERY | Age: 30
DRG: 540 | End: 2020-09-10
Payer: MEDICAID

## 2020-09-10 VITALS — OXYGEN SATURATION: 100 % | DIASTOLIC BLOOD PRESSURE: 57 MMHG | SYSTOLIC BLOOD PRESSURE: 101 MMHG

## 2020-09-10 PROBLEM — O99.213 OBESITY COMPLICATING PREGNANCY IN THIRD TRIMESTER: Status: ACTIVE | Noted: 2020-09-10

## 2020-09-10 PROBLEM — Z98.891 S/P CESAREAN SECTION: Status: ACTIVE | Noted: 2020-09-10

## 2020-09-10 PROBLEM — O36.8390 NON-REASSURING ELECTRONIC FETAL MONITORING TRACING: Status: ACTIVE | Noted: 2020-09-10

## 2020-09-10 LAB
RPR: REACTIVE
TOTAL SYPHILLIS IGG/IGM: REACTIVE

## 2020-09-10 PROCEDURE — 3700000025 EPIDURAL BLOCK: Performed by: ANESTHESIOLOGY

## 2020-09-10 PROCEDURE — 2500000003 HC RX 250 WO HCPCS: Performed by: NURSE ANESTHETIST, CERTIFIED REGISTERED

## 2020-09-10 PROCEDURE — 3E033VJ INTRODUCTION OF OTHER HORMONE INTO PERIPHERAL VEIN, PERCUTANEOUS APPROACH: ICD-10-PCS | Performed by: OBSTETRICS & GYNECOLOGY

## 2020-09-10 PROCEDURE — 6360000002 HC RX W HCPCS: Performed by: OBSTETRICS & GYNECOLOGY

## 2020-09-10 PROCEDURE — 7100000001 HC PACU RECOVERY - ADDTL 15 MIN: Performed by: OBSTETRICS & GYNECOLOGY

## 2020-09-10 PROCEDURE — 3609079900 HC CESAREAN SECTION: Performed by: OBSTETRICS & GYNECOLOGY

## 2020-09-10 PROCEDURE — 3700000000 HC ANESTHESIA ATTENDED CARE: Performed by: OBSTETRICS & GYNECOLOGY

## 2020-09-10 PROCEDURE — 2580000003 HC RX 258: Performed by: OBSTETRICS & GYNECOLOGY

## 2020-09-10 PROCEDURE — 1220000000 HC SEMI PRIVATE OB R&B

## 2020-09-10 PROCEDURE — 2580000003 HC RX 258: Performed by: NURSE ANESTHETIST, CERTIFIED REGISTERED

## 2020-09-10 PROCEDURE — 3700000001 HC ADD 15 MINUTES (ANESTHESIA): Performed by: OBSTETRICS & GYNECOLOGY

## 2020-09-10 PROCEDURE — 7100000000 HC PACU RECOVERY - FIRST 15 MIN: Performed by: OBSTETRICS & GYNECOLOGY

## 2020-09-10 PROCEDURE — 10907ZC DRAINAGE OF AMNIOTIC FLUID, THERAPEUTIC FROM PRODUCTS OF CONCEPTION, VIA NATURAL OR ARTIFICIAL OPENING: ICD-10-PCS | Performed by: OBSTETRICS & GYNECOLOGY

## 2020-09-10 PROCEDURE — 6360000002 HC RX W HCPCS: Performed by: NURSE ANESTHETIST, CERTIFIED REGISTERED

## 2020-09-10 PROCEDURE — 51701 INSERT BLADDER CATHETER: CPT

## 2020-09-10 PROCEDURE — 2709999900 HC NON-CHARGEABLE SUPPLY: Performed by: OBSTETRICS & GYNECOLOGY

## 2020-09-10 RX ORDER — METRONIDAZOLE 250 MG/1
500 TABLET ORAL EVERY 8 HOURS SCHEDULED
Status: DISCONTINUED | OUTPATIENT
Start: 2020-09-10 | End: 2020-09-11

## 2020-09-10 RX ORDER — SODIUM CHLORIDE 0.9 % (FLUSH) 0.9 %
10 SYRINGE (ML) INJECTION EVERY 12 HOURS SCHEDULED
Status: DISCONTINUED | OUTPATIENT
Start: 2020-09-10 | End: 2020-09-12 | Stop reason: HOSPADM

## 2020-09-10 RX ORDER — EPHEDRINE SULFATE 50 MG/ML
INJECTION INTRAVENOUS PRN
Status: DISCONTINUED | OUTPATIENT
Start: 2020-09-10 | End: 2020-09-10 | Stop reason: SDUPTHER

## 2020-09-10 RX ORDER — LIDOCAINE HYDROCHLORIDE 20 MG/ML
INJECTION, SOLUTION EPIDURAL; INFILTRATION; INTRACAUDAL; PERINEURAL PRN
Status: DISCONTINUED | OUTPATIENT
Start: 2020-09-10 | End: 2020-09-10 | Stop reason: SDUPTHER

## 2020-09-10 RX ORDER — KETOROLAC TROMETHAMINE 30 MG/ML
30 INJECTION, SOLUTION INTRAMUSCULAR; INTRAVENOUS EVERY 8 HOURS
Status: COMPLETED | OUTPATIENT
Start: 2020-09-10 | End: 2020-09-11

## 2020-09-10 RX ORDER — LANOLIN 100 %
OINTMENT (GRAM) TOPICAL
Status: DISCONTINUED | OUTPATIENT
Start: 2020-09-10 | End: 2020-09-12 | Stop reason: HOSPADM

## 2020-09-10 RX ORDER — SODIUM CHLORIDE, SODIUM LACTATE, POTASSIUM CHLORIDE, CALCIUM CHLORIDE 600; 310; 30; 20 MG/100ML; MG/100ML; MG/100ML; MG/100ML
INJECTION, SOLUTION INTRAVENOUS CONTINUOUS
Status: DISCONTINUED | OUTPATIENT
Start: 2020-09-10 | End: 2020-09-12 | Stop reason: HOSPADM

## 2020-09-10 RX ORDER — DIPHENHYDRAMINE HYDROCHLORIDE 50 MG/ML
25 INJECTION INTRAMUSCULAR; INTRAVENOUS EVERY 6 HOURS PRN
Status: DISCONTINUED | OUTPATIENT
Start: 2020-09-10 | End: 2020-09-12 | Stop reason: HOSPADM

## 2020-09-10 RX ORDER — PRENATAL WITH FERROUS FUM AND FOLIC ACID 3080; 920; 120; 400; 22; 1.84; 3; 20; 10; 1; 12; 200; 27; 25; 2 [IU]/1; [IU]/1; MG/1; [IU]/1; MG/1; MG/1; MG/1; MG/1; MG/1; MG/1; UG/1; MG/1; MG/1; MG/1; MG/1
1 TABLET ORAL DAILY
Status: DISCONTINUED | OUTPATIENT
Start: 2020-09-11 | End: 2020-09-12 | Stop reason: HOSPADM

## 2020-09-10 RX ORDER — OXYCODONE HYDROCHLORIDE 5 MG/1
5 TABLET ORAL EVERY 4 HOURS PRN
Status: DISCONTINUED | OUTPATIENT
Start: 2020-09-10 | End: 2020-09-12 | Stop reason: HOSPADM

## 2020-09-10 RX ORDER — PROPOFOL 10 MG/ML
INJECTION, EMULSION INTRAVENOUS PRN
Status: DISCONTINUED | OUTPATIENT
Start: 2020-09-10 | End: 2020-09-10 | Stop reason: SDUPTHER

## 2020-09-10 RX ORDER — SODIUM CHLORIDE 0.9 % (FLUSH) 0.9 %
10 SYRINGE (ML) INJECTION PRN
Status: DISCONTINUED | OUTPATIENT
Start: 2020-09-10 | End: 2020-09-12 | Stop reason: HOSPADM

## 2020-09-10 RX ORDER — BUPIVACAINE HYDROCHLORIDE 5 MG/ML
INJECTION, SOLUTION EPIDURAL; INTRACAUDAL PRN
Status: DISCONTINUED | OUTPATIENT
Start: 2020-09-10 | End: 2020-09-10 | Stop reason: SDUPTHER

## 2020-09-10 RX ORDER — DOCUSATE SODIUM 100 MG/1
100 CAPSULE, LIQUID FILLED ORAL 2 TIMES DAILY
Status: DISCONTINUED | OUTPATIENT
Start: 2020-09-10 | End: 2020-09-12 | Stop reason: HOSPADM

## 2020-09-10 RX ORDER — ACETAMINOPHEN 500 MG
1000 TABLET ORAL EVERY 8 HOURS SCHEDULED
Status: DISCONTINUED | OUTPATIENT
Start: 2020-09-10 | End: 2020-09-12 | Stop reason: HOSPADM

## 2020-09-10 RX ORDER — OXYTOCIN 10 [USP'U]/ML
INJECTION, SOLUTION INTRAMUSCULAR; INTRAVENOUS PRN
Status: DISCONTINUED | OUTPATIENT
Start: 2020-09-10 | End: 2020-09-10 | Stop reason: SDUPTHER

## 2020-09-10 RX ORDER — KETOROLAC TROMETHAMINE 30 MG/ML
INJECTION, SOLUTION INTRAMUSCULAR; INTRAVENOUS PRN
Status: DISCONTINUED | OUTPATIENT
Start: 2020-09-10 | End: 2020-09-10 | Stop reason: SDUPTHER

## 2020-09-10 RX ORDER — IBUPROFEN 800 MG/1
800 TABLET ORAL EVERY 8 HOURS SCHEDULED
Status: DISCONTINUED | OUTPATIENT
Start: 2020-09-10 | End: 2020-09-12 | Stop reason: HOSPADM

## 2020-09-10 RX ORDER — EPHEDRINE SULFATE 50 MG/ML
INJECTION, SOLUTION INTRAVENOUS
Status: DISCONTINUED
Start: 2020-09-10 | End: 2020-09-10

## 2020-09-10 RX ORDER — ONDANSETRON 2 MG/ML
4 INJECTION INTRAMUSCULAR; INTRAVENOUS EVERY 6 HOURS PRN
Status: DISCONTINUED | OUTPATIENT
Start: 2020-09-10 | End: 2020-09-12 | Stop reason: HOSPADM

## 2020-09-10 RX ORDER — FERROUS SULFATE 325(65) MG
325 TABLET ORAL 2 TIMES DAILY WITH MEALS
Status: DISCONTINUED | OUTPATIENT
Start: 2020-09-11 | End: 2020-09-12 | Stop reason: HOSPADM

## 2020-09-10 RX ORDER — BUPIVACAINE HYDROCHLORIDE 2.5 MG/ML
INJECTION, SOLUTION EPIDURAL; INFILTRATION; INTRACAUDAL PRN
Status: DISCONTINUED | OUTPATIENT
Start: 2020-09-10 | End: 2020-09-10 | Stop reason: SDUPTHER

## 2020-09-10 RX ORDER — CARBOPROST TROMETHAMINE 250 UG/ML
250 INJECTION, SOLUTION INTRAMUSCULAR
Status: ACTIVE | OUTPATIENT
Start: 2020-09-10 | End: 2020-09-10

## 2020-09-10 RX ORDER — CARBOXYMETHYLCELLULOSE SODIUM 10 MG/ML
2 GEL OPHTHALMIC 4 TIMES DAILY PRN
Status: DISCONTINUED | OUTPATIENT
Start: 2020-09-10 | End: 2020-09-12 | Stop reason: HOSPADM

## 2020-09-10 RX ORDER — METHYLERGONOVINE MALEATE 0.2 MG/ML
200 INJECTION INTRAVENOUS PRN
Status: DISCONTINUED | OUTPATIENT
Start: 2020-09-10 | End: 2020-09-12 | Stop reason: HOSPADM

## 2020-09-10 RX ORDER — ONDANSETRON 2 MG/ML
INJECTION INTRAMUSCULAR; INTRAVENOUS PRN
Status: DISCONTINUED | OUTPATIENT
Start: 2020-09-10 | End: 2020-09-10 | Stop reason: SDUPTHER

## 2020-09-10 RX ORDER — NALOXONE HYDROCHLORIDE 0.4 MG/ML
0.4 INJECTION, SOLUTION INTRAMUSCULAR; INTRAVENOUS; SUBCUTANEOUS PRN
Status: DISCONTINUED | OUTPATIENT
Start: 2020-09-10 | End: 2020-09-12 | Stop reason: HOSPADM

## 2020-09-10 RX ORDER — OXYCODONE HYDROCHLORIDE 5 MG/1
10 TABLET ORAL EVERY 4 HOURS PRN
Status: DISCONTINUED | OUTPATIENT
Start: 2020-09-10 | End: 2020-09-12 | Stop reason: HOSPADM

## 2020-09-10 RX ORDER — SODIUM CHLORIDE, SODIUM LACTATE, POTASSIUM CHLORIDE, CALCIUM CHLORIDE 600; 310; 30; 20 MG/100ML; MG/100ML; MG/100ML; MG/100ML
INJECTION, SOLUTION INTRAVENOUS CONTINUOUS PRN
Status: DISCONTINUED | OUTPATIENT
Start: 2020-09-10 | End: 2020-09-10 | Stop reason: SDUPTHER

## 2020-09-10 RX ORDER — ONDANSETRON 8 MG/1
8 TABLET, ORALLY DISINTEGRATING ORAL EVERY 8 HOURS PRN
Status: DISCONTINUED | OUTPATIENT
Start: 2020-09-10 | End: 2020-09-12 | Stop reason: HOSPADM

## 2020-09-10 RX ADMIN — EPHEDRINE SULFATE 25 MG: 50 INJECTION INTRAVENOUS at 02:27

## 2020-09-10 RX ADMIN — SODIUM CHLORIDE, POTASSIUM CHLORIDE, SODIUM LACTATE AND CALCIUM CHLORIDE: 600; 310; 30; 20 INJECTION, SOLUTION INTRAVENOUS at 18:41

## 2020-09-10 RX ADMIN — PROPOFOL 15 MG: 10 INJECTION, EMULSION INTRAVENOUS at 18:54

## 2020-09-10 RX ADMIN — SODIUM CHLORIDE, POTASSIUM CHLORIDE, SODIUM LACTATE AND CALCIUM CHLORIDE: 600; 310; 30; 20 INJECTION, SOLUTION INTRAVENOUS at 00:24

## 2020-09-10 RX ADMIN — SODIUM CHLORIDE, POTASSIUM CHLORIDE, SODIUM LACTATE AND CALCIUM CHLORIDE: 600; 310; 30; 20 INJECTION, SOLUTION INTRAVENOUS at 14:36

## 2020-09-10 RX ADMIN — SODIUM CHLORIDE, POTASSIUM CHLORIDE, SODIUM LACTATE AND CALCIUM CHLORIDE: 600; 310; 30; 20 INJECTION, SOLUTION INTRAVENOUS at 19:51

## 2020-09-10 RX ADMIN — EPHEDRINE SULFATE 25 MG: 50 INJECTION INTRAVENOUS at 02:28

## 2020-09-10 RX ADMIN — SODIUM CHLORIDE, POTASSIUM CHLORIDE, SODIUM LACTATE AND CALCIUM CHLORIDE: 600; 310; 30; 20 INJECTION, SOLUTION INTRAVENOUS at 18:16

## 2020-09-10 RX ADMIN — AZITHROMYCIN MONOHYDRATE 500 MG: 500 INJECTION, POWDER, LYOPHILIZED, FOR SOLUTION INTRAVENOUS at 18:20

## 2020-09-10 RX ADMIN — Medication 15 ML/HR: at 00:43

## 2020-09-10 RX ADMIN — SODIUM CHLORIDE, POTASSIUM CHLORIDE, SODIUM LACTATE AND CALCIUM CHLORIDE: 600; 310; 30; 20 INJECTION, SOLUTION INTRAVENOUS at 02:30

## 2020-09-10 RX ADMIN — BUPIVACAINE HYDROCHLORIDE 2.5 ML: 2.5 INJECTION, SOLUTION EPIDURAL; INFILTRATION; INTRACAUDAL; PERINEURAL at 00:38

## 2020-09-10 RX ADMIN — LIDOCAINE HYDROCHLORIDE 5 ML: 20 INJECTION, SOLUTION EPIDURAL; INFILTRATION; INTRACAUDAL; PERINEURAL at 18:35

## 2020-09-10 RX ADMIN — Medication 1 MILLI-UNITS/MIN: at 06:00

## 2020-09-10 RX ADMIN — SODIUM CHLORIDE, POTASSIUM CHLORIDE, SODIUM LACTATE AND CALCIUM CHLORIDE: 600; 310; 30; 20 INJECTION, SOLUTION INTRAVENOUS at 19:07

## 2020-09-10 RX ADMIN — SODIUM CHLORIDE, POTASSIUM CHLORIDE, SODIUM LACTATE AND CALCIUM CHLORIDE: 600; 310; 30; 20 INJECTION, SOLUTION INTRAVENOUS at 06:05

## 2020-09-10 RX ADMIN — KETOROLAC TROMETHAMINE 30 MG: 60 INJECTION, SOLUTION INTRAMUSCULAR at 19:28

## 2020-09-10 RX ADMIN — LIDOCAINE HYDROCHLORIDE 5 ML: 20 INJECTION, SOLUTION EPIDURAL; INFILTRATION; INTRACAUDAL; PERINEURAL at 18:36

## 2020-09-10 RX ADMIN — OXYTOCIN 20 UNITS: 10 INJECTION INTRAVENOUS at 19:07

## 2020-09-10 RX ADMIN — BUPIVACAINE HYDROCHLORIDE 2.5 ML: 5 INJECTION, SOLUTION EPIDURAL; INTRACAUDAL; PERINEURAL at 00:38

## 2020-09-10 RX ADMIN — LIDOCAINE HYDROCHLORIDE 5 ML: 20 INJECTION, SOLUTION EPIDURAL; INFILTRATION; INTRACAUDAL; PERINEURAL at 18:43

## 2020-09-10 RX ADMIN — ONDANSETRON 4 MG: 2 INJECTION INTRAMUSCULAR; INTRAVENOUS at 18:54

## 2020-09-10 RX ADMIN — SODIUM CHLORIDE, POTASSIUM CHLORIDE, SODIUM LACTATE AND CALCIUM CHLORIDE: 600; 310; 30; 20 INJECTION, SOLUTION INTRAVENOUS at 19:55

## 2020-09-10 RX ADMIN — CEFAZOLIN SODIUM 3 G: 10 INJECTION, POWDER, FOR SOLUTION INTRAVENOUS at 18:17

## 2020-09-10 ASSESSMENT — PULMONARY FUNCTION TESTS
PIF_VALUE: 0

## 2020-09-10 NOTE — PLAN OF CARE
Problem: Anxiety:  Goal: Level of anxiety will decrease  Description: Level of anxiety will decrease  9/10/2020 1217 by Sanju Melchor RN  Outcome: Ongoing  9/10/2020 0641 by Dima Mitchell RN  Outcome: Ongoing     Problem: Breathing Pattern - Ineffective:  Goal: Able to breathe comfortably  Description: Able to breathe comfortably  9/10/2020 1217 by Sanju Melchor RN  Outcome: Ongoing  9/10/2020 0641 by Dima Mitchell RN  Outcome: Ongoing     Problem: Fluid Volume - Imbalance:  Goal: Absence of imbalanced fluid volume signs and symptoms  Description: Absence of imbalanced fluid volume signs and symptoms  9/10/2020 1217 by Sanju Melchor RN  Outcome: Ongoing  9/10/2020 0641 by Dima Mitchell RN  Outcome: Ongoing  Goal: Absence of intrapartum hemorrhage signs and symptoms  Description: Absence of intrapartum hemorrhage signs and symptoms  9/10/2020 1217 by Sanju Melchor RN  Outcome: Ongoing  9/10/2020 0641 by Dima Mitchell RN  Outcome: Ongoing     Problem: Infection - Intrapartum Infection:  Goal: Will show no infection signs and symptoms  Description: Will show no infection signs and symptoms  9/10/2020 1217 by Sanju Melchor RN  Outcome: Ongoing  9/10/2020 0641 by Dima Mitchell RN  Outcome: Ongoing     Problem: Labor Process - Prolonged:  Goal: Labor progression, first stage, within specified pattern  Description: Labor progression, first stage, within specified pattern  9/10/2020 1217 by Sanju Melchor RN  Outcome: Ongoing  9/10/2020 0641 by Dima Mitchell RN  Outcome: Ongoing  Goal: Labor progession, second stage, within specified pattern  Description: Labor progession, second stage, within specified pattern  Outcome: Ongoing  Goal: Uterine contractions within specified parameters  Description: Uterine contractions within specified parameters  Outcome: Ongoing     Problem:  Screening:  Goal: Ability to make informed decisions regarding treatment has improved  Description: Ability to make informed decisions regarding treatment has improved  9/10/2020 1217 by Matthew To RN  Outcome: Ongoing  9/10/2020 0641 by Sobia Prado RN  Outcome: Ongoing     Problem: Pain - Acute:  Goal: Pain level will decrease  Description: Pain level will decrease  Outcome: Ongoing  Goal: Able to cope with pain  Description: Able to cope with pain  9/10/2020 1217 by Matthew To RN  Outcome: Ongoing  9/10/2020 0641 by Sobia Prado RN  Outcome: Ongoing     Problem: Tissue Perfusion - Uteroplacental, Altered:  Goal: Absence of abnormal fetal heart rate pattern  Description: Absence of abnormal fetal heart rate pattern  9/10/2020 1217 by Matthew To RN  Outcome: Ongoing  9/10/2020 0641 by Sobia Prado RN  Outcome: Ongoing     Problem: Urinary Retention:  Goal: Experiences of bladder distention will decrease  Description: Experiences of bladder distention will decrease  9/10/2020 1217 by Matthew To RN  Outcome: Ongoing  9/10/2020 0641 by Sobia Prado RN  Outcome: Ongoing  Goal: Urinary elimination within specified parameters  Description: Urinary elimination within specified parameters  Outcome: Ongoing     Problem: Pain:  Goal: Pain level will decrease  Description: Pain level will decrease  Outcome: Ongoing  Goal: Control of acute pain  Description: Control of acute pain  Outcome: Ongoing  Goal: Control of chronic pain  Description: Control of chronic pain  Outcome: Ongoing

## 2020-09-10 NOTE — PROGRESS NOTES
S: Patient comfortable with epidural. Nurse reports thin meconium fluid present. O:  Vitals:    09/10/20 1601 09/10/20 1631 09/10/20 1701 09/10/20 1714   BP: 118/63 122/70 118/73 117/76   Pulse: 83 94 83 101   Resp: 16      Temp:       TempSrc:       Weight:       Height:       Cx:5cm/90%/-2, vertex  Mec stained fluid  [place]: ctx q 2-3 min., >200 [place] for 6 hrs. Pitocin was at 10 milliU then had recurrent late decels to 80's, resolved w/ position changes & stopping pitocin, fluid bolus was given; Pitocin restarted at 5 milliU one hour later, had recurrent late decels, stopped pitocin, return to baseline, restarted at 1 milliU and had an immediate decel, stopped pitocin    A/P:  27 y/o  @ 40.2 wks. Failure to progress, arrest of dilation, NRFHT - d/w pt. Recommendation for a PLTCS - R/B/a/expected outcomes d/w pt., questions answered & pt stated understanding.   Fetus - tracing reassuring  Will give pt Zithromax & Ancef

## 2020-09-10 NOTE — PROGRESS NOTES
S: Patient comfortable after epidural  O:  Vitals:    09/10/20 0831 09/10/20 0901 09/10/20 0931 09/10/20 1001   BP: 110/70 111/66 108/67 106/62   Pulse: 96 82 85 88   Resp: 16 16 16 16   Temp:   97.7 °F (36.5 °C)    TempSrc:   Axillary    Weight:       Height:       Cx- 4/70%-2, anterior  toco- ctx q 4-6 minutes  FHT-130's, cat I  IUPC & FSE placed w/o difficulty  AROM - clear fluid  A/P:  25 y/o  @ 40.2 wks.    Labor - progressing, AROM & internals placed, anticipate   Fetus - tracing reassuring

## 2020-09-10 NOTE — ANESTHESIA PRE PROCEDURE
Department of Anesthesiology  Preprocedure Note       Name:  Bianca Farfan   Age:  26 y.o.  :  1990                                          MRN:  [id number]         Date:  9/10/2020      Surgeon: * No surgeons listed *    Procedure: * No procedures listed *    Medications prior to admission:   Prior to Admission medications    Medication Sig Start Date End Date Taking?  Authorizing Provider   ferrous sulfate 134 MG TABS Take 325 mg by mouth 2 times daily   Yes Historical Provider, MD   ASPIRIN LOW DOSE 81 MG EC tablet  20  Yes Historical Provider, MD   Prenatal Vit-Fe Fumarate-FA (PRENATAL VITAMIN PLUS LOW IRON) 27-1 MG TABS Take 1 tablet by mouth daily 20  Yes Phil HOPKINS MD   methylPREDNISolone (MEDROL DOSEPACK) 4 MG tablet Take 1 tablet by mouth See Admin Instructions Take by mouth as directed on package 5/10/20   Gera Barber DO   tears naturale II (CELLUGEL) SOLN ophthalmic solution Place 2 drops into the left eye 4 times daily as needed for Dry Eyes 5/10/20   Gera Barber DO       Current medications:    Current Facility-Administered Medications   Medication Dose Route Frequency Provider Last Rate Last Dose    acetaminophen (TYLENOL) tablet 650 mg  650 mg Oral Q4H PRN Clyde Nichols MD        oxytocin (PITOCIN) 30 units in 500 mL infusion  1 karina-units/min Intravenous Continuous PRN Clyde Nichols MD        diphenhydrAMINE (BENADRYL) injection 25 mg  25 mg Intravenous Q4H PRN Clyde Nichols MD        lactated ringers infusion   Intravenous Continuous Clyde Nichols  mL/hr at 09/10/20 0024      lidocaine PF 1 % injection 30 mL  30 mL Other PRN Clyde Nichols MD        misoprostol (CYTOTEC) pre-split tablet TABS 25 mcg  25 mcg Vaginal Q4H Clyde Nichols MD        ondansetron [facility]) injection 4 mg  4 mg Intravenous Q6H PRN Clyde Nichols MD        oxytocin (PITOCIN) 30 units in 500 mL infusion  1 karina-units/min Intravenous Continuous Marisela Rai MD 2 mL/hr at 09/10/20 0000 2 karina-units/min at 09/10/20 0000    sodium chloride flush 0.9 % injection 10 mL  10 mL Intravenous 2 times per day Angel Rayo MD        sodium chloride flush 0.9 % injection 10 mL  10 mL Intravenous PRN Angel Rayo MD         Facility-Administered Medications Ordered in Other Encounters   Medication Dose Route Frequency Provider Last Rate Last Dose    bupivacaine (PF) (MARCAINE) 0.25 % injection    PRN Shahbaz Lara APRN - CRNA   2.5 mL at 09/10/20 0038    bupivacaine (PF) (MARCAINE) 0.5 % injection    PRN Shahbaz Lara, APRN - CRNA   2.5 mL at 09/10/20 0038    sodium chloride 0.9 % 200 mL with fentaNYL 500 mcg, bupivacaine 0.5% 50 mL (OB) epidural    Continuous PRN Shahbaz Lara APRN - CRNA 15 mL/hr at 09/10/20 0043 15 mL/hr at 09/10/20 0043       Allergies: Allergies   Allergen Reactions    Shellfish-Derived Products     Strawberry Extract        Problem List:    Patient Active Problem List   Diagnosis Code    Obesity in pregnancy, antepartum O99.210    Insufficient prenatal care O09.30    H/O syphilis Z86.19    Facial droop R29.810    22 weeks gestation of pregnancy Z3A.22    Bell palsy G51.0    Slurred speech R47.81    ASCUS of cervix with negative high risk HPV R87.610    Normal labor O80, Z37.9    Term pregnancy Z34.90       Past Medical History:        Diagnosis Date    Anemia 09/09/2020    with pregnancy    Bell's palsy 2020    Late latent syphilis        Past Surgical History:  History reviewed. No pertinent surgical history.     Social History:    Social History     Tobacco Use    Smoking status: Current Every Day Smoker     Packs/day: 0.25     Types: Cigars, Cigarettes    Smokeless tobacco: Never Used   Substance Use Topics    Alcohol use: Not Currently     Comment: 2-3 week                                Ready to quit: Not Answered  Counseling given: Not Answered      Vital Signs (Current):   Vitals:    09/09/20 2100 09/09/20 2200

## 2020-09-10 NOTE — PLAN OF CARE
Ongoing     Problem:  Screening:  Goal: Ability to make informed decisions regarding treatment has improved  Description: Ability to make informed decisions regarding treatment has improved  9/10/2020 1217 by Kaden Vasques RN  Outcome: Ongoing  9/10/2020 0641 by Coco Mcelroy RN  Outcome: Ongoing     Problem: Pain - Acute:  Goal: Pain level will decrease  Description: Pain level will decrease  Outcome: Ongoing  Pt denies pain since getting epidural.  Goal: Able to cope with pain  Description: Able to cope with pain  9/10/2020 1217 by Kaden Vasques RN  Outcome: Ongoing  9/10/2020 0641 by Coco Mcelroy RN  Outcome: Ongoing     Problem: Tissue Perfusion - Uteroplacental, Altered:  Goal: Absence of abnormal fetal heart rate pattern  Description: Absence of abnormal fetal heart rate pattern  9/10/2020 1217 by Kaden Vasques RN  Outcome: Ongoing  9/10/2020 0641 by Coco Mcelroy RN  Outcome: Ongoing     Problem: Urinary Retention:  Goal: Experiences of bladder distention will decrease  Description: Experiences of bladder distention will decrease  9/10/2020 1217 by Kaden Vasques RN  Outcome: Ongoing    Daniels catheter inserted @ 0730. Draining clear yellow urine.   9/10/2020 0641 by Coco Mcelroy RN  Outcome: Ongoing  Goal: Urinary elimination within specified parameters  Description: Urinary elimination within specified parameters  Outcome: Ongoing     Problem: Pain:  Goal: Pain level will decrease  Description: Pain level will decrease  Outcome: Ongoing  Goal: Control of acute pain  Description: Control of acute pain  Outcome: Ongoing  Goal: Control of chronic pain  Description: Control of chronic pain  Outcome: Ongoing

## 2020-09-10 NOTE — PROGRESS NOTES
Call placed to Dr. Ariella South. MD updated on FHR/CTX pattern and that pt now on O2 by mask with continued fetal decels. MD also informed of pt's repositioning and fluid bolus. MD states to continue to monitor.

## 2020-09-10 NOTE — PROGRESS NOTES
Dr. Yoli Ro at pt bedside to discuss use of a ornelas bulb to assist in cervical dilation in place of cytotec. Pt agreeable at this time.

## 2020-09-10 NOTE — ANESTHESIA PROCEDURE NOTES
Epidural Block    Patient location during procedure: OB  Start time: 9/10/2020 12:20 AM  End time: 9/10/2020 12:43 AM  Reason for block: labor epidural  Staffing  Resident/CRNA: MEGAN Powell CRNA  Performed: resident/CRNA   Preanesthetic Checklist  Completed: patient identified, site marked, surgical consent, pre-op evaluation, timeout performed, IV checked, risks and benefits discussed, monitors and equipment checked, anesthesia consent given, oxygen available and patient being monitored  Epidural  Patient position: sitting  Prep: ChloraPrep  Patient monitoring: continuous pulse ox and frequent blood pressure checks  Approach: midline  Location: lumbar (1-5) (L3-4)  Injection technique: GAMALIEL saline  Provider prep: mask and sterile gloves  Needle  Needle type: Tuohy   Needle gauge: 17 G  Needle length: 3.5 in  Catheter type: side hole  Catheter size: 19 G  Catheter at skin depth: 13 cm  Test dose: negative  Assessment  Sensory level: T6  Hemodynamics: stable  Attempts: 2  Additional Notes  Sitting, Sterile prep/drape, 1%Xylo at L3-4, 17ga Tuohy with GAMALIEL, 25ga Pencan for w/+CSF for DPE, Pencan removed, Catheter inserted, negative test dose, sterile dressing applied.

## 2020-09-10 NOTE — PLAN OF CARE
Problem: Anxiety:  Goal: Level of anxiety will decrease  Description: Level of anxiety will decrease  9/10/2020 0641 by Bridgette Adam RN  Outcome: Ongoing     Problem: Breathing Pattern - Ineffective:  Goal: Able to breathe comfortably  Description: Able to breathe comfortably  9/10/2020 0641 by Bridgette Adam RN  Outcome: Ongoing     Problem: Fluid Volume - Imbalance:  Goal: Absence of imbalanced fluid volume signs and symptoms  Description: Absence of imbalanced fluid volume signs and symptoms  9/10/2020 0641 by Bridgette Adam RN  Outcome: Ongoing     Problem: Fluid Volume - Imbalance:  Goal: Absence of intrapartum hemorrhage signs and symptoms  Description: Absence of intrapartum hemorrhage signs and symptoms  9/10/2020 0641 by Bridgette Adam RN  Outcome: Ongoing     Problem: Infection - Intrapartum Infection:  Goal: Will show no infection signs and symptoms  Description: Will show no infection signs and symptoms  9/10/2020 0641 by Bridgette Adam RN  Outcome: Ongoing     Problem: Labor Process - Prolonged:  Goal: Labor progression, first stage, within specified pattern  Description: Labor progression, first stage, within specified pattern  9/10/2020 0641 by Bridgette Adam RN  Outcome: Ongoing     Problem:  Screening:  Goal: Ability to make informed decisions regarding treatment has improved  Description: Ability to make informed decisions regarding treatment has improved  9/10/2020 0641 by Bridgette Adam RN  Outcome: Ongoing     Problem: Pain - Acute:  Goal: Able to cope with pain  Description: Able to cope with pain  9/10/2020 0641 by Bridgette Adam RN  Outcome: Ongoing  Pt has epidural in place for pain management. Pt stating 0/10 pain at this time.      Problem: Tissue Perfusion - Uteroplacental, Altered:  Goal: Absence of abnormal fetal heart rate pattern  Description: Absence of abnormal fetal heart rate pattern  9/10/2020 0641 by Bridgette Adam RN  Outcome: Ongoing     Problem: Urinary Retention:  Goal: Experiences of bladder distention will decrease  Description: Experiences of bladder distention will decrease  9/10/2020 0641 by Dima Mitchell RN  Outcome: Ongoing

## 2020-09-10 NOTE — PLAN OF CARE
Problem: Anxiety:  Goal: Level of anxiety will decrease  Description: Level of anxiety will decrease  9/10/2020 1217 by Blayne Lomax RN  Outcome: Ongoing  9/10/2020 0641 by Caridad Lui RN  Outcome: Ongoing     Problem: Breathing Pattern - Ineffective:  Goal: Able to breathe comfortably  Description: Able to breathe comfortably  9/10/2020 1217 by Blayne Lomax RN  Outcome: Ongoing  9/10/2020 06 by Caridad Lui RN  Outcome: Ongoing     Problem: Fluid Volume - Imbalance:  Goal: Absence of imbalanced fluid volume signs and symptoms  Description: Absence of imbalanced fluid volume signs and symptoms  9/10/2020 1217 by Blayne Lomax RN  Outcome: Ongoing  9/10/2020 0641 by Caridad Lui RN  Outcome: Ongoing  Goal: Absence of intrapartum hemorrhage signs and symptoms  Description: Absence of intrapartum hemorrhage signs and symptoms  9/10/2020 1217 by Blayne Lomax RN  Outcome: Ongoing  9/10/2020 06 by Caridad Lui RN  Outcome: Ongoing     Problem: Infection - Intrapartum Infection:  Goal: Will show no infection signs and symptoms  Description: Will show no infection signs and symptoms  9/10/2020 1217 by Blayne Lomax RN  Outcome: Ongoing  9/10/2020 06 by Caridad Lui RN  Outcome: Ongoing     Problem: Labor Process - Prolonged:  Goal: Labor progression, first stage, within specified pattern  Description: Labor progression, first stage, within specified pattern  9/10/2020 1217 by Blayne Lomax RN  Outcome: Ongoing  9/10/2020 0641 by Caridad Lui RN  Outcome: Ongoing  Goal: Labor progession, second stage, within specified pattern  Description: Labor progession, second stage, within specified pattern  Outcome: Ongoing  Goal: Uterine contractions within specified parameters  Description: Uterine contractions within specified parameters  Outcome: Ongoing     Problem:  Screening:  Goal: Ability to make informed decisions regarding treatment has improved  Description: Ability to make informed decisions regarding treatment has improved  9/10/2020 1217 by Jackeline Arroyo RN  Outcome: Ongoing  9/10/2020 0641 by Ashley Sharma RN  Outcome: Ongoing     Problem: Pain - Acute:  Goal: Pain level will decrease  Description: Pain level will decrease  Outcome: Ongoing  Goal: Able to cope with pain  Description: Able to cope with pain  9/10/2020 1217 by Jackeline Arroyo RN  Outcome: Ongoing  9/10/2020 0641 by Ashley Sharma RN  Outcome: Ongoing     Problem: Tissue Perfusion - Uteroplacental, Altered:  Goal: Absence of abnormal fetal heart rate pattern  Description: Absence of abnormal fetal heart rate pattern  9/10/2020 1217 by Jackeline Arroyo RN  Outcome: Ongoing  9/10/2020 0641 by Ashley Sharma RN  Outcome: Ongoing     Problem: Urinary Retention:  Goal: Experiences of bladder distention will decrease  Description: Experiences of bladder distention will decrease  9/10/2020 1217 by Jackeline Arroyo RN  Outcome: Ongoing  9/10/2020 0641 by Ashley Sharma RN  Outcome: Ongoing  Goal: Urinary elimination within specified parameters  Description: Urinary elimination within specified parameters  Outcome: Ongoing     Problem: Pain:  Goal: Pain level will decrease  Description: Pain level will decrease  Outcome: Ongoing  Goal: Control of acute pain  Description: Control of acute pain  Outcome: Ongoing  Goal: Control of chronic pain  Description: Control of chronic pain  Outcome: Ongoing

## 2020-09-10 NOTE — PROGRESS NOTES
Dr. Owen Correa at pt bedside. Ornelas bulb placed in pt cervix by Dr. Owen Correa with 50 mL fluid inserted into balloon. MD states to give traction to ornelas bulb q 1 hr. Verbal order received to begin Pitocin. Will continue to monitor.

## 2020-09-10 NOTE — FLOWSHEET NOTE
Called Dr. Dutsin Ortega and updated her on pt's labor status. SVE 4/80-90/-2 with thin pale green msf. 235 MVU. Marge q2-3 minutes apart with a CAT 1 tracing.

## 2020-09-10 NOTE — PROGRESS NOTES
Call placed to Dr. Brandon Moseley to inform her that pt's pitocin has been turned off at this time following back-to-back fetal decels. MD agreeable. Call placed to Brock Holman CRNA to treat pt's decreasing BP.

## 2020-09-11 LAB
BASOPHILS ABSOLUTE: 0.1 K/UL (ref 0–0.2)
BASOPHILS RELATIVE PERCENT: 0.4 %
EOSINOPHILS ABSOLUTE: 0.1 K/UL (ref 0–0.6)
EOSINOPHILS RELATIVE PERCENT: 1.2 %
HCT VFR BLD CALC: 34.5 % (ref 36–48)
HEMOGLOBIN: 11.2 G/DL (ref 12–16)
LYMPHOCYTES ABSOLUTE: 2.6 K/UL (ref 1–5.1)
LYMPHOCYTES RELATIVE PERCENT: 20.8 %
MCH RBC QN AUTO: 29.4 PG (ref 26–34)
MCHC RBC AUTO-ENTMCNC: 32.5 G/DL (ref 31–36)
MCV RBC AUTO: 90.3 FL (ref 80–100)
MONOCYTES ABSOLUTE: 1 K/UL (ref 0–1.3)
MONOCYTES RELATIVE PERCENT: 8.3 %
NEUTROPHILS ABSOLUTE: 8.5 K/UL (ref 1.7–7.7)
NEUTROPHILS RELATIVE PERCENT: 69.3 %
PDW BLD-RTO: 14.1 % (ref 12.4–15.4)
PLATELET # BLD: 232 K/UL (ref 135–450)
PMV BLD AUTO: 8 FL (ref 5–10.5)
RBC # BLD: 3.82 M/UL (ref 4–5.2)
WBC # BLD: 12.3 K/UL (ref 4–11)

## 2020-09-11 PROCEDURE — 1220000000 HC SEMI PRIVATE OB R&B

## 2020-09-11 PROCEDURE — 6370000000 HC RX 637 (ALT 250 FOR IP): Performed by: OBSTETRICS & GYNECOLOGY

## 2020-09-11 PROCEDURE — 85025 COMPLETE CBC W/AUTO DIFF WBC: CPT

## 2020-09-11 PROCEDURE — 36415 COLL VENOUS BLD VENIPUNCTURE: CPT

## 2020-09-11 PROCEDURE — 2580000003 HC RX 258: Performed by: OBSTETRICS & GYNECOLOGY

## 2020-09-11 PROCEDURE — 6360000002 HC RX W HCPCS: Performed by: OBSTETRICS & GYNECOLOGY

## 2020-09-11 RX ADMIN — ONDANSETRON 4 MG: 2 INJECTION INTRAMUSCULAR; INTRAVENOUS at 09:06

## 2020-09-11 RX ADMIN — OXYCODONE HYDROCHLORIDE 5 MG: 5 TABLET ORAL at 20:28

## 2020-09-11 RX ADMIN — IBUPROFEN 800 MG: 800 TABLET, FILM COATED ORAL at 09:06

## 2020-09-11 RX ADMIN — DOCUSATE SODIUM 100 MG: 100 CAPSULE, LIQUID FILLED ORAL at 20:28

## 2020-09-11 RX ADMIN — IBUPROFEN 800 MG: 800 TABLET, FILM COATED ORAL at 20:28

## 2020-09-11 RX ADMIN — ONDANSETRON 4 MG: 2 INJECTION INTRAMUSCULAR; INTRAVENOUS at 00:58

## 2020-09-11 RX ADMIN — METFORMIN HYDROCHLORIDE 1 TABLET: 500 TABLET, EXTENDED RELEASE ORAL at 09:07

## 2020-09-11 RX ADMIN — ACETAMINOPHEN 1000 MG: 500 TABLET ORAL at 04:36

## 2020-09-11 RX ADMIN — DOCUSATE SODIUM 100 MG: 100 CAPSULE, LIQUID FILLED ORAL at 09:06

## 2020-09-11 RX ADMIN — KETOROLAC TROMETHAMINE 30 MG: 30 INJECTION, SOLUTION INTRAMUSCULAR at 00:26

## 2020-09-11 RX ADMIN — CEFAZOLIN 1 G: 1 INJECTION, POWDER, FOR SOLUTION INTRAMUSCULAR; INTRAVENOUS at 02:26

## 2020-09-11 RX ADMIN — ENOXAPARIN SODIUM 40 MG: 40 INJECTION SUBCUTANEOUS at 09:07

## 2020-09-11 RX ADMIN — SODIUM CHLORIDE, POTASSIUM CHLORIDE, SODIUM LACTATE AND CALCIUM CHLORIDE: 600; 310; 30; 20 INJECTION, SOLUTION INTRAVENOUS at 03:59

## 2020-09-11 RX ADMIN — CEFAZOLIN 1 G: 1 INJECTION, POWDER, FOR SOLUTION INTRAMUSCULAR; INTRAVENOUS at 09:06

## 2020-09-11 ASSESSMENT — PAIN SCALES - GENERAL
PAINLEVEL_OUTOF10: 6
PAINLEVEL_OUTOF10: 4
PAINLEVEL_OUTOF10: 1
PAINLEVEL_OUTOF10: 0

## 2020-09-11 NOTE — PROGRESS NOTES
Call placed to Dr. Edward Evans to discuss pt's post-op orders. MD confirmed pt's scheduled IV Ancef order. MD states to discontinue Flagyl order.

## 2020-09-11 NOTE — ANESTHESIA POSTPROCEDURE EVALUATION
Department of Anesthesiology  Postprocedure Note    Patient: Corinne Loupe  MRN: [id number]  YOB: 1990  Date of evaluation: 2020  Time:  9:04 AM     Procedure Summary     Date:  09/10/20 Room / Location:  [facility] L&D OR [address] [place] [address]    Anesthesia Start:  20 Anesthesia Stop:      Procedures:        SECTION (N/A Abdomen)      Labor Analgesia Diagnosis:  (Failure to Progress)    Surgeon:  Annelise Akers DO Responsible Provider:  Alexsandra Nevarez MD    Anesthesia Type:  epidural ASA Status:  3 - Emergent          Anesthesia Type: No value filed. Jaime Phase I: Jaime Score: 9    Jaime Phase II: Jaime Score: 9    Last vitals: Reviewed and per EMR flowsheets. Anesthesia Post Evaluation    Level of consciousness: awake  Complications: no  Cardiovascular status: hemodynamically stable  Respiratory status: acceptable  Comments: No apparent complications from neuraxial anesthesia.

## 2020-09-11 NOTE — PROGRESS NOTES
S: No complaints, ning po, pain controlled by meds, + ambulation, no flatus    O: /66   Pulse 85   Temp 98.2 °F (36.8 °C) (Axillary)   Resp 16   Ht 5' 6\" (1.676 m)   Wt 243 lb (110.2 kg)   LMP 12/03/2019   SpO2 100%   Breastfeeding Unknown   BMI 39.22 kg/m²   Abd - ff below umbilicus, inc c/d/i  Ext - +1 edema    WBC/Hgb/Hct/Plts:  12.3/11.2/34.5/232 (09/11 0817)    A/P: POD #1 LTCS  1. Doing well  2. Advance care  3.  Anticipate d/c home in 1-2 days

## 2020-09-11 NOTE — LACTATION NOTE
This note was copied from a baby's chart. Lactation Progress Note      Data:  Initial lactation consult with megan BETTIEO. RN states that infant received some formula supplement after delivery per mob's request r/t maternal fatigue and hands being numb, and unable to hold infant to breast. MOB also feels that she doesn't have enough breastmilk at this time to provide for infant. LC to assist with breast feeding, and provide breast feeding education and support. Infant output established      Action: Introduced self to patient as Lactation RN, name and phone number written on white board in room. Infant alert with feeding cues present. LC offers assistance with breast feeding, mob agreeable. Digital suck assessment performed. RN with some concerns that infant may have a frenulum. Digital tongue assessment done. Infant uncoordinated with suck burst initially biting on gloved finger. Infant able to elevate tongue and seems to have a good range of mobility. Tongue brought out to lower lip but did not extend at this time. Coordination with suck burst on gloved finger continued to improve with appropriate suction noted. Infant was then given to mob in football hold to right breast. Hand expression was done with mob. Large gtts of colostrum was easily expressed. LC then explained to mob what colostrum is, and how it is ready and available for infant after delivery. Reviewed the process of lactogenesis with mob and fob, and encouraged to allow infant to go to breast without supplementation unless medically indicated. LC also supported mob's decision to supplement if she chooses, but encouraged to keep supplement today to a minimum of 5-10 mls with syringe. Infant continues to root attempting to latch. After few minutes, JEANINE was achieved with SRS observed and AS x8 minutes. Infant then coming off breast on his own. Several more gtts were given after feeding attempts to see if infant would wake.  Infant was then placed on mob's chest STS. Infant sleeping at this time. Much praise and support was given to mob. Reviewed with mother what to expect over the next  24-48 hours with infant feedings, infant output, and breast care. Encouraged mob to hand express colostrum for infant with sleepy feedings. Reviewed infant feeding cues and encouraged mother to allow infant to breast feed on demand, a minimum of 8-12 times a day after the first day of life. Also encouraged mother to avoid giving infant a pacifier or bottle for at least the first two weeks of life. Binder and breast feeding log reviewed, all questions answered. Mother instructed to call Lactation nurse for F/U care as needed. Response: MOB pleased with infant's latch and feeding at time of consult. Verbalizes understanding of breast feeding education that was provided. Feels comfortable with providing infant with colostrum and understands that infant is getting what he needs at this time. Will call LC for f/u care as needed during her stay.

## 2020-09-11 NOTE — PLAN OF CARE
Problem: Discharge Planning:  Goal: Discharged to appropriate level of care  Description: Discharged to appropriate level of care  Outcome: Ongoing     Problem: Fluid Volume - Imbalance:  Goal: Absence of postpartum hemorrhage signs and symptoms  Description: Absence of postpartum hemorrhage signs and symptoms  Outcome: Ongoing     Problem: Fluid Volume - Imbalance:  Goal: Absence of imbalanced fluid volume signs and symptoms  Description: Absence of imbalanced fluid volume signs and symptoms  Outcome: Ongoing     Problem: Infection - Surgical Site:  Goal: Will show no infection signs and symptoms  Description: Will show no infection signs and symptoms  Outcome: Ongoing     Problem: Mood - Altered:  Goal: Mood stable  Description: Mood stable  Outcome: Ongoing     Problem: Nausea/Vomiting:  Goal: Absence of nausea/vomiting  Description: Absence of nausea/vomiting  Outcome: Ongoing     Problem: Pain - Acute:  Goal: Pain level will decrease  Description: Pain level will decrease  Outcome: Ongoing  Patient states 0/10 pain at this time. Will continue to monitor. Problem: Urinary Retention:  Goal: Urinary elimination within specified parameters  Description: Urinary elimination within specified parameters  Outcome: Ongoing  Pt has ornelas catheter in place at this time.      Problem: Venous Thromboembolism:  Goal: Will show no signs or symptoms of venous thromboembolism  Description: Will show no signs or symptoms of venous thromboembolism  Outcome: Ongoing     Problem: Venous Thromboembolism:  Goal: Absence of signs or symptoms of impaired coagulation  Description: Absence of signs or symptoms of impaired coagulation  Outcome: Ongoing

## 2020-09-11 NOTE — CARE COORDINATION
Social Work Consult/Assessment    Reason for Consult: MJ use early pregnancy   Electronic record reviewed: yes   Delivery information: CS-L TRANV 40 weeks, 9/10/20, infant Boy \"Leif Siu\"  Marital Status: single  Mob's UDS on admission:  negitive  Infant's UDS/Cord tox: pending    Met with Mob today explained purpose of visit, SW services. Present in the room: none  Living situation: MOBlives with FOB  Spouse or significant other: Fam Erazo  Children: this is the first   Children's Protective Sevices involvement: none  Support system: pt reported that the FOB and her mother are very supportive. Domestic Violence: denied  Mental Health:denied  Post Partum Depression:Discussed Signs and SX  Substance Abuse: MOB reported that she used Cocaine and Marijuana in the past. She said she has not used Cocaine in years. marijuana she used just before she found out she was pregnant. Pt said while pregnant she ate a cup cake at her family members home and di not relize it was an edible with Marijuana in it. Social Assistance Programs:  WIC_x__ Food Stamps___  Medicaid__x_  Supplies: Pt reported she has what she needs   Every Child Succeeds: Pt reported that she will set up on her own     Summary:   SW explained that a Tox cord Screen was sent on the infant. If it comes back positive that SW will have to call to report it to the CPS. MOB reported she understands.   WAYNE Rodriguez

## 2020-09-12 VITALS
SYSTOLIC BLOOD PRESSURE: 130 MMHG | DIASTOLIC BLOOD PRESSURE: 70 MMHG | WEIGHT: 243 LBS | HEIGHT: 66 IN | OXYGEN SATURATION: 96 % | TEMPERATURE: 98.5 F | RESPIRATION RATE: 18 BRPM | BODY MASS INDEX: 39.05 KG/M2 | HEART RATE: 89 BPM

## 2020-09-12 PROCEDURE — 6370000000 HC RX 637 (ALT 250 FOR IP): Performed by: OBSTETRICS & GYNECOLOGY

## 2020-09-12 PROCEDURE — 6360000002 HC RX W HCPCS: Performed by: OBSTETRICS & GYNECOLOGY

## 2020-09-12 RX ORDER — FERROUS SULFATE 325(65) MG
325 TABLET ORAL 2 TIMES DAILY WITH MEALS
Qty: 60 TABLET | Refills: 0 | Status: SHIPPED | OUTPATIENT
Start: 2020-09-12

## 2020-09-12 RX ORDER — OXYCODONE HYDROCHLORIDE 5 MG/1
5 TABLET ORAL EVERY 8 HOURS PRN
Qty: 9 TABLET | Refills: 0 | Status: SHIPPED | OUTPATIENT
Start: 2020-09-12 | End: 2020-09-16

## 2020-09-12 RX ORDER — IBUPROFEN 800 MG/1
800 TABLET ORAL EVERY 8 HOURS SCHEDULED
Qty: 30 TABLET | Refills: 0 | Status: SHIPPED | OUTPATIENT
Start: 2020-09-12

## 2020-09-12 RX ADMIN — DOCUSATE SODIUM 100 MG: 100 CAPSULE, LIQUID FILLED ORAL at 09:10

## 2020-09-12 RX ADMIN — ACETAMINOPHEN 1000 MG: 500 TABLET ORAL at 01:00

## 2020-09-12 RX ADMIN — METFORMIN HYDROCHLORIDE 1 TABLET: 500 TABLET, EXTENDED RELEASE ORAL at 09:10

## 2020-09-12 RX ADMIN — OXYCODONE HYDROCHLORIDE 10 MG: 5 TABLET ORAL at 09:10

## 2020-09-12 RX ADMIN — ENOXAPARIN SODIUM 40 MG: 40 INJECTION SUBCUTANEOUS at 09:09

## 2020-09-12 RX ADMIN — ACETAMINOPHEN 1000 MG: 500 TABLET ORAL at 09:10

## 2020-09-12 RX ADMIN — IBUPROFEN 800 MG: 800 TABLET, FILM COATED ORAL at 04:41

## 2020-09-12 ASSESSMENT — PAIN SCALES - GENERAL
PAINLEVEL_OUTOF10: 4
PAINLEVEL_OUTOF10: 4
PAINLEVEL_OUTOF10: 8

## 2020-09-12 NOTE — LACTATION NOTE
This note was copied from a baby's chart. Lactation Progress Note      Data:   F/U on primip breastfeeder who delivered yesterday. Mother reports infant is breastfeeding well but felt football was uncomfortable for her and infant. Action: Introduced self as lactation RN for this shift. Name and number written on board. Breastfeeding education material from education folder shown to mother with the pictures and explanations of the different breastfeeding holds and went over each one. Encouraged mother to call with next feeding to assist with a different hold. Response: Mother appreciative of the material and planned to read the rest of the breastfeeding material from the education folder. Will call if she would like help with different hold and prn for f/u.

## 2020-09-12 NOTE — LACTATION NOTE
This note was copied from a baby's chart. Lactation Progress Note      Data:     F/u during lactation rounds with primip breast feeding mother baby couplet, 2 po. Pt reports baby is latching and breast feeding well. Pt gave a 1x supplement after delivery 10 mL's by syringe d/t numbness to hands and arms from spinal per RN documentation. Pt denies any questions or concerns at this time. Pacifier noted in crib. Repeat weight check has not been documented. RN notified and made aware. States weight check was obtained from overnight nurse per shift report and reports WDL. Infant with 5 voids and 4 stools since delivery. Action: Introduced self as  [facility] on for today and offered much breast feeding support. Breast feeding education reviewed in including breast care, prevention/treatment of engorgement. expected  feeding behaviors during the first 24-48 hours of life, signs of hunger/satiety, hand expression of colostrum, and how to know baby is getting enough at the breast including appropriate feedings, daily output, and weight trends. Encouraged much STS, offering the breast exclusively, when infant is just beginning to wake and show early signs of hunger, and every 3 hours if sleepy and without feeding cues. Encouraged much STS and hand expression of colostrum when offering the breast. Instructed pt that baby should have a minimum of 8-12 good feedings in a 24 hour period after the first DOL. Educated on risks to breast feeding relationship related to using pacifiers, artificial nipples, and/or formula supplements, and recommended exclusive breastfeeding unless medical indication were to arise. Reviewed importance of deep comfortable latch. Gave tips to achieve JEANINE, and explained how a good latch should look and feel, and how to break latch if shallow, pinching, or painful.  Reviewed when to begin preparing for return to work, encouraging around 4-6 weeks postpartum and educated on tips to promote smooth

## 2020-09-12 NOTE — DISCHARGE SUMMARY
(CELLUGEL) SOLN ophthalmic solution Comments:   Reason for Stopping:               No discharge procedures on file. Discharge to: Home  Follow up in 2 weeks at [facility] with Dr. Cardona. Discharge Date: 20 Time: 08:45      Comments  S:Ambulating, tolerating regular diet, decreased lochia, passing flatus, urinating without complaints, pain controlled with oral meds. O:  Vitals:    20 1305 20 1629 20 2028 20 0103   BP:  110/65  128/77   Pulse:  82  93   Resp:  18  18   Temp:  97.9 °F (36.6 °C)  98.4 °F (36.9 °C)   TempSrc:  Oral  Axillary   SpO2: 100% 100% 96%    Weight:       Height:         Abd:BS present, NT,ND  Inc:C/D/I  Fundus:Firm 2-3 cm below umbilicus  Recent Labs     20  0817   WBC 12.3*   RBC 3.82*   HGB 11.2*   HCT 34.5*   MCV 90.3   RDW 14.1        A/P:28 y/o  PPD#1 from [facility]  1)Progressing -d/c today, pelvic rest d/w pt. F/U in 2 wks. for incision check or sooner prn.  2)Pain - Percocet and Ibuprofen prescribed.

## 2020-09-12 NOTE — FLOWSHEET NOTE
Discharge Phone Call Log  Patient Name: Alayna Eng     [facility] Provider: Zane Nava MD Discharge Date: 2020    Disposition of baby:    Phone Number: [phone] (home)     Attempts to Contact:  Date:    Nurse  Date:    Nurse  Date:    Nurse    1. Now that you are at home is your pain being well controlled? Y/N   What pain reducing measures are you using? ____________________________________        Information for the patient's :  Dwayne Corona [[id number]]   Delivery Method: , Low Transverse     2. Are you currently  having any infant feeding issues? Y/N _____________________________ If yes, please explain: __________________________________________________________________  3. If breastfeeding, were you satisfied with the breastfeeding support services offered? Y/N  4.  Have you had to supplement? Y/N If yes, please explain: _____________________________________________________  5. Did your OB provider offer you information about the benefits of breastfeeding during your prenatal visits? Y/N  6.  Have you made or have you already had your first appointment with the baby's doctor? Y/N If no, do you know when to schedule it? Y/N   7.  Have you scheduled your follow-up appointment? Y/N  If no, do you know when to schedule it? Y/N  8. Did staff discuss safe sleep during your stay? Y/N  Did you see the wall cling posted in your room explaining how to keep you and your baby safe? Y/N  9. Can you tell me at least 1 point you learned from reading or hearing about infant safety and safe sleep practices? 10. Did your nurses and physicians include you in the plan of care, communicating with you respectfully? Y/N If no, please explain __________________________  11. Is there anyone in particular you would like to mention who provided care for you? ________________________________  12. Did your discharge occur in a timely manner?   Y/N If no, please explain __________________________  13. Do you have any other questions or concerns I can address today?  Y/N  __________________________________________________      Teaching During interview :_____________________________________________  ___________________________RN       Date:______________Time:________________

## 2020-09-17 LAB
Lab: NORMAL
REPORT: NORMAL
THIS TEST SENT TO: NORMAL

## 2021-11-15 ENCOUNTER — CLINICAL DOCUMENTATION (OUTPATIENT)
Dept: OTHER | Age: 31
End: 2021-11-15

## 2022-06-14 ENCOUNTER — HOSPITAL ENCOUNTER (EMERGENCY)
Age: 32
Discharge: HOME | End: 2022-06-14
Payer: MEDICAID

## 2022-06-14 VITALS
HEART RATE: 95 BPM | RESPIRATION RATE: 16 BRPM | OXYGEN SATURATION: 98 % | SYSTOLIC BLOOD PRESSURE: 123 MMHG | TEMPERATURE: 98 F | DIASTOLIC BLOOD PRESSURE: 87 MMHG

## 2022-06-14 DIAGNOSIS — S61.012A LACERATION OF LEFT THUMB WITHOUT FOREIGN BODY WITHOUT DAMAGE TO NAIL, INITIAL ENCOUNTER: Primary | ICD-10-CM

## 2022-06-14 PROCEDURE — 99282 EMERGENCY DEPT VISIT SF MDM: CPT

## 2022-06-14 PROCEDURE — 12001 RPR S/N/AX/GEN/TRNK 2.5CM/<: CPT

## 2022-06-14 NOTE — ED PROVIDER NOTES
[facility]  ED  EMERGENCY DEPARTMENT ENCOUNTER        Pt Name: Naresh Carey  MRN: [id number]  [place] 1990  Date of evaluation: 2022  Provider: Brigette Quiroz  PCP: [address]  Note Started: 6:54 PM EDT       RITU. I have evaluated this patient. My supervising physician was available for consultation. Jose Vu      CHIEF COMPLAINT       Chief Complaint   Patient presents with    Laceration     left can on a can of green beans       HISTORY OF PRESENT ILLNESS   (Location, Timing/Onset, Context/Setting, Quality, Duration, Modifying Factors, Severity, Associated Signs and Symptoms)  Note limiting factors. Chief Complaint: Laceration nondominant left thumb    Kasia Oscar is a 27 y.o. female who presents with laceration nondominant left thumb volar aspect. This is in the vicinity of the proximal aspect proximal phalanx. At 6 PM this evening. Opening a can of green beans and the jagged lid because of this transverse laceration measuring 1.5 cm. No active bleeding. Neurosensory is intact. She has good movement. Patient reports tetanus shot . Nursing Notes were all reviewed and agreed with or any disagreements were addressed in the HPI. REVIEW OF SYSTEMS    (2-9 systems for level 4, 10 or more for level 5)     Review of Systems    Positives and Pertinent negatives as per HPI. Except as noted above in the ROS, all other systems were reviewed and negative.        PAST MEDICAL HISTORY     Past Medical History:   Diagnosis Date    Anemia 2020    with pregnancy    Bell's palsy     Late latent syphilis          SURGICAL HISTORY     Past Surgical History:   Procedure Laterality Date     SECTION N/A 9/10/2020     SECTION performed by Mirna Saeed DO at [facility] L&D OR     SECTION, LOW TRANSVERSE  09/10/2020         CURRENTMEDICATIONS       Previous Medications    FERROUS SULFATE (IRON 325) 325 (65 FE) MG TABLET    Take 1 tablet by mouth 2 times daily (with meals)    IBUPROFEN (ADVIL;MOTRIN) 800 MG TABLET    Take 1 tablet by mouth every 8 hours    PRENATAL VIT-FE FUMARATE-FA (PRENATAL VITAMIN PLUS LOW IRON) 27-1 MG TABS    Take 1 tablet by mouth daily         ALLERGIES     Shellfish-derived products and Strawberry extract    FAMILYHISTORY       Family History   Problem Relation Age of Onset    Cancer Paternal Grandmother     Diabetes Maternal Grandmother     Other Mother         fibromyalgia    Hypothyroidism Father         Goiter          SOCIAL HISTORY       Social History     Tobacco Use    Smoking status: Current Every Day Smoker     Packs/day: 0.25     Types: Cigars, Cigarettes    Smokeless tobacco: Never Used   Vaping Use    Vaping Use: Never used   Substance Use Topics    Alcohol use: Not Currently     Comment: 2-3 week    Drug use: Not Currently     Types: Marijuana Norval Remak)     Comment: 2 weeks ago-edible cupckes       SCREENINGS             PHYSICAL EXAM    (up to 7 for level 4, 8 or more for level 5)     ED Triage Vitals [06/14/22 1815]   BP Temp Temp Source Heart Rate Resp SpO2 Height Weight   123/87 98 °F (36.7 °C) Oral 95 16 98 % -- --       Physical Exam  Vitals and nursing note reviewed. Constitutional:       Appearance: Normal appearance. She is well-developed. She is obese. HENT:      Head: Normocephalic and atraumatic. Right Ear: External ear normal.      Left Ear: External ear normal.   Eyes:      General: No scleral icterus. Right eye: No discharge. Left eye: No discharge. Conjunctiva/sclera: Conjunctivae normal.   Cardiovascular:      Rate and Rhythm: Normal rate. Pulmonary:      Effort: Pulmonary effort is normal.   Musculoskeletal:         General: Signs of injury present. Normal range of motion. Cervical back: Normal range of motion and neck supple. Comments:  The patient with a 1.5 cm transverse laceration palmar aspect base of the nondominant left thumb. She has good opposition strength. She has good strength against flexion/resistance. Skin:     General: Skin is warm and dry. Neurological:      General: No focal deficit present. Mental Status: She is alert and oriented to person, place, and time. Mental status is at baseline. Psychiatric:         Mood and Affect: Mood normal.         Behavior: Behavior normal.         Thought Content: Thought content normal.         Judgment: Judgment normal.         DIAGNOSTIC RESULTS   LABS:    Labs Reviewed - No data to display    When ordered only abnormal lab results are displayed. All other labs were within normal range or not returned as of this dictation. EKG: When ordered, EKG's are interpreted by the Emergency Department Physician in the absence of a cardiologist.  Please see their note for interpretation of EKG. RADIOLOGY:   Non-plain film images such as CT, Ultrasound and MRI are read by the radiologist. Plain radiographic images are visualized and preliminarily interpreted by the ED Provider with the below findings:        Interpretation per the Radiologist below, if available at the time of this note:    No orders to display     No results found. PROCEDURES     I did use accommodation 1% plain lidocaine and 0.5% plain Marcaine. Once anesthesia was noted it was draped in sterile fashion cleansed with chlorhexidine rinse and irrigated with saline. Exploration reveals full dermal thickness into subcutaneous without exposure of any other deep structures. No for material found in the wound. I did use 5-0 Prolene to close the wound primarily. Dressing applied.      Procedures    CRITICAL CARE TIME       CONSULTS:  None      EMERGENCY DEPARTMENT COURSE and DIFFERENTIAL DIAGNOSIS/MDM:   Vitals:    Vitals:    06/14/22 1815   BP: 123/87   Pulse: 95   Resp: 16   Temp: 98 °F (36.7 °C)   TempSrc: Oral   SpO2: 98%       Patient was given the following medications:  Medications - No data to display      Is this patient to be included in the SEP-1 Core Measure due to severe sepsis or septic shock? No   Exclusion criteria - the patient is NOT to be included for SEP-1 Core Measure due to: Infection is not suspected    The patient presented with laceration palmar aspect of the nondominant left thumb at the base. Transverse laceration 1.5 cm. Primary to close. Ibuprofen and Tylenol for pain control. Antibiotics not indicated. Tetanus shot August, 2021. Recommend suture removal in approximately 10 days by healthcare provider. The patient did express understanding of her diagnosis and the treatment plan. FINAL IMPRESSION      1. Laceration of left thumb without foreign body without damage to nail, initial encounter          DISPOSITION/PLAN   DISPOSITION Decision To Discharge 06/14/2022 06:52:43 PM      PATIENT REFERRED TO:  [address] [place] [address]    Schedule an appointment as soon as possible for a visit in 10 days  For suture removal    [facility]  ED  [address]  Go to   If symptoms worsen      DISCHARGE MEDICATIONS:  New Prescriptions    No medications on file       DISCONTINUED MEDICATIONS:  Discontinued Medications    No medications on file              (Please note that portions of this note were completed with a voice recognition program.  Efforts were made to edit the dictations but occasionally words are mis-transcribed. )    Sun Hdz PA-C (electronically signed)           Sun Hdz PA-C  06/14/22 8380

## 2023-07-17 NOTE — PLAN OF CARE
Pt A&Ox4 lying in bed. Pt orientated to room and call light. Bed is in lowest position with wheels locked. 2/4 siderails raised. Non-slip socks are on. Room is well lit. Call light within reach, will continue to monitor. Silver Nitrate Text: The wound bed was treated with silver nitrate after the biopsy was performed.

## 2025-07-22 ENCOUNTER — APPOINTMENT (OUTPATIENT)
Dept: CT IMAGING | Age: 35
End: 2025-07-22
Payer: COMMERCIAL

## 2025-07-22 ENCOUNTER — HOSPITAL ENCOUNTER (EMERGENCY)
Age: 35
Discharge: HOME | End: 2025-07-22
Attending: STUDENT IN AN ORGANIZED HEALTH CARE EDUCATION/TRAINING PROGRAM
Payer: COMMERCIAL

## 2025-07-22 ENCOUNTER — APPOINTMENT (OUTPATIENT)
Dept: GENERAL RADIOLOGY | Age: 35
End: 2025-07-22
Payer: COMMERCIAL

## 2025-07-22 VITALS
OXYGEN SATURATION: 100 % | SYSTOLIC BLOOD PRESSURE: 150 MMHG | WEIGHT: 256.2 LBS | DIASTOLIC BLOOD PRESSURE: 93 MMHG | TEMPERATURE: 97.1 F | RESPIRATION RATE: 17 BRPM | HEART RATE: 67 BPM | HEIGHT: 66 IN | BODY MASS INDEX: 41.17 KG/M2

## 2025-07-22 DIAGNOSIS — R10.13 ABDOMINAL PAIN, EPIGASTRIC: Primary | ICD-10-CM

## 2025-07-22 LAB
ALBUMIN SERPL-MCNC: 4 G/DL (ref 3.4–5)
ALP SERPL-CCNC: 79 U/L (ref 40–129)
ALT SERPL-CCNC: 36 U/L (ref 10–40)
AMORPH SED URNS QL MICRO: ABNORMAL /HPF
ANION GAP SERPL CALCULATED.3IONS-SCNC: 13 MMOL/L (ref 3–16)
AST SERPL-CCNC: 56 U/L (ref 15–37)
BACTERIA URNS QL MICRO: ABNORMAL /HPF
BASOPHILS # BLD: 0.1 K/UL (ref 0–0.2)
BASOPHILS NFR BLD: 1.2 %
BILIRUB DIRECT SERPL-MCNC: <0.1 MG/DL (ref 0–0.3)
BILIRUB INDIRECT SERPL-MCNC: ABNORMAL MG/DL (ref 0–1)
BILIRUB SERPL-MCNC: <0.2 MG/DL (ref 0–1)
BILIRUB UR QL STRIP.AUTO: NEGATIVE
BUN SERPL-MCNC: 9 MG/DL (ref 7–20)
CALCIUM SERPL-MCNC: 9.2 MG/DL (ref 8.3–10.6)
CHLORIDE SERPL-SCNC: 105 MMOL/L (ref 99–110)
CLARITY UR: CLEAR
CO2 SERPL-SCNC: 18 MMOL/L (ref 21–32)
COLOR UR: YELLOW
CREAT SERPL-MCNC: 0.7 MG/DL (ref 0.6–1.1)
D-DIMER QUANTITATIVE: 0.36 UG/ML FEU (ref 0–0.6)
DEPRECATED RDW RBC AUTO: 14.1 % (ref 12.4–15.4)
EKG ATRIAL RATE: 90 BPM
EKG DIAGNOSIS: NORMAL
EKG P AXIS: 73 DEGREES
EKG P-R INTERVAL: 156 MS
EKG Q-T INTERVAL: 380 MS
EKG QRS DURATION: 72 MS
EKG QTC CALCULATION (BAZETT): 464 MS
EKG R AXIS: 66 DEGREES
EKG T AXIS: 66 DEGREES
EKG VENTRICULAR RATE: 90 BPM
EOSINOPHIL # BLD: 0.2 K/UL (ref 0–0.6)
EOSINOPHIL NFR BLD: 2.8 %
EPI CELLS #/AREA URNS HPF: ABNORMAL /HPF (ref 0–5)
GFR SERPLBLD CREATININE-BSD FMLA CKD-EPI: >90 ML/MIN/{1.73_M2}
GLUCOSE SERPL-MCNC: 94 MG/DL (ref 70–99)
GLUCOSE UR STRIP.AUTO-MCNC: NEGATIVE MG/DL
HCG SERPL QL: NEGATIVE
HCT VFR BLD AUTO: 40.4 % (ref 36–48)
HGB BLD-MCNC: 13.7 G/DL (ref 12–16)
HGB UR QL STRIP.AUTO: NEGATIVE
KETONES UR STRIP.AUTO-MCNC: NEGATIVE MG/DL
LEUKOCYTE ESTERASE UR QL STRIP.AUTO: ABNORMAL
LIPASE SERPL-CCNC: 37 U/L (ref 13–60)
LYMPHOCYTES # BLD: 3.3 K/UL (ref 1–5.1)
LYMPHOCYTES NFR BLD: 49.6 %
MCH RBC QN AUTO: 29.4 PG (ref 26–34)
MCHC RBC AUTO-ENTMCNC: 33.9 G/DL (ref 31–36)
MCV RBC AUTO: 87 FL (ref 80–100)
MONOCYTES # BLD: 0.9 K/UL (ref 0–1.3)
MONOCYTES NFR BLD: 13.6 %
NEUTROPHILS # BLD: 2.2 K/UL (ref 1.7–7.7)
NEUTROPHILS NFR BLD: 32.8 %
NITRITE UR QL STRIP.AUTO: NEGATIVE
NT-PROBNP SERPL-MCNC: <36 PG/ML (ref 0–124)
PH UR STRIP.AUTO: 6.5 [PH] (ref 5–8)
PLATELET # BLD AUTO: 249 K/UL (ref 135–450)
PMV BLD AUTO: 8.6 FL (ref 5–10.5)
POTASSIUM SERPL-SCNC: 4.5 MMOL/L (ref 3.5–5.1)
PROT SERPL-MCNC: 7.1 G/DL (ref 6.4–8.2)
PROT UR STRIP.AUTO-MCNC: NEGATIVE MG/DL
RBC # BLD AUTO: 4.65 M/UL (ref 4–5.2)
RBC #/AREA URNS HPF: ABNORMAL /HPF (ref 0–4)
SODIUM SERPL-SCNC: 136 MMOL/L (ref 136–145)
SP GR UR STRIP.AUTO: 1.01 (ref 1–1.03)
TROPONIN, HIGH SENSITIVITY: 14 NG/L (ref 0–14)
TROPONIN, HIGH SENSITIVITY: 9 NG/L (ref 0–14)
UA COMPLETE W REFLEX CULTURE PNL UR: ABNORMAL
UA DIPSTICK W REFLEX MICRO PNL UR: YES
URN SPEC COLLECT METH UR: ABNORMAL
UROBILINOGEN UR STRIP-ACNC: 0.2 E.U./DL
WBC # BLD AUTO: 6.7 K/UL (ref 4–11)
WBC #/AREA URNS HPF: ABNORMAL /HPF (ref 0–5)

## 2025-07-22 PROCEDURE — 99285 EMERGENCY DEPT VISIT HI MDM: CPT

## 2025-07-22 PROCEDURE — 71046 X-RAY EXAM CHEST 2 VIEWS: CPT

## 2025-07-22 PROCEDURE — 6360000002 HC RX W HCPCS

## 2025-07-22 PROCEDURE — 85379 FIBRIN DEGRADATION QUANT: CPT

## 2025-07-22 PROCEDURE — 80048 BASIC METABOLIC PNL TOTAL CA: CPT

## 2025-07-22 PROCEDURE — 84484 ASSAY OF TROPONIN QUANT: CPT

## 2025-07-22 PROCEDURE — 96374 THER/PROPH/DIAG INJ IV PUSH: CPT

## 2025-07-22 PROCEDURE — 93005 ELECTROCARDIOGRAM TRACING: CPT

## 2025-07-22 PROCEDURE — 83880 ASSAY OF NATRIURETIC PEPTIDE: CPT

## 2025-07-22 PROCEDURE — 85025 COMPLETE CBC W/AUTO DIFF WBC: CPT

## 2025-07-22 PROCEDURE — 81001 URINALYSIS AUTO W/SCOPE: CPT

## 2025-07-22 PROCEDURE — 96375 TX/PRO/DX INJ NEW DRUG ADDON: CPT

## 2025-07-22 PROCEDURE — 83690 ASSAY OF LIPASE: CPT

## 2025-07-22 PROCEDURE — 6360000004 HC RX CONTRAST MEDICATION

## 2025-07-22 PROCEDURE — 84703 CHORIONIC GONADOTROPIN ASSAY: CPT

## 2025-07-22 PROCEDURE — 74177 CT ABD & PELVIS W/CONTRAST: CPT

## 2025-07-22 PROCEDURE — 80076 HEPATIC FUNCTION PANEL: CPT

## 2025-07-22 RX ORDER — HYDROMORPHONE HYDROCHLORIDE 1 MG/ML
1 INJECTION, SOLUTION INTRAMUSCULAR; INTRAVENOUS; SUBCUTANEOUS ONCE
Status: COMPLETED | OUTPATIENT
Start: 2025-07-22 | End: 2025-07-22

## 2025-07-22 RX ORDER — IOPAMIDOL 755 MG/ML
75 INJECTION, SOLUTION INTRAVASCULAR
Status: COMPLETED | OUTPATIENT
Start: 2025-07-22 | End: 2025-07-22

## 2025-07-22 RX ORDER — KETOROLAC TROMETHAMINE 30 MG/ML
15 INJECTION, SOLUTION INTRAMUSCULAR; INTRAVENOUS ONCE
Status: COMPLETED | OUTPATIENT
Start: 2025-07-22 | End: 2025-07-22

## 2025-07-22 RX ADMIN — HYDROMORPHONE HYDROCHLORIDE 1 MG: 1 INJECTION, SOLUTION INTRAMUSCULAR; INTRAVENOUS; SUBCUTANEOUS at 10:48

## 2025-07-22 RX ADMIN — IOPAMIDOL 75 ML: 755 INJECTION, SOLUTION INTRAVENOUS at 13:27

## 2025-07-22 RX ADMIN — KETOROLAC TROMETHAMINE 15 MG: 30 INJECTION, SOLUTION INTRAMUSCULAR at 10:47

## 2025-07-22 ASSESSMENT — PAIN SCALES - GENERAL: PAINLEVEL_OUTOF10: 9

## 2025-07-22 ASSESSMENT — PAIN - FUNCTIONAL ASSESSMENT: PAIN_FUNCTIONAL_ASSESSMENT: 0-10

## 2025-07-22 ASSESSMENT — PAIN DESCRIPTION - ORIENTATION: ORIENTATION: MID

## 2025-07-22 ASSESSMENT — PAIN DESCRIPTION - LOCATION: LOCATION: ABDOMEN;CHEST

## 2025-07-22 ASSESSMENT — PAIN DESCRIPTION - DESCRIPTORS: DESCRIPTORS: TEARING

## 2025-07-22 ASSESSMENT — LIFESTYLE VARIABLES
HOW OFTEN DO YOU HAVE A DRINK CONTAINING ALCOHOL: 4 OR MORE TIMES A WEEK
HOW MANY STANDARD DRINKS CONTAINING ALCOHOL DO YOU HAVE ON A TYPICAL DAY: 3 OR 4

## 2025-07-22 NOTE — ED PROVIDER NOTES
ED Attending Attestation Note     Date of evaluation: 7/22/2025    This patient was seen by the resident.  I have seen and examined the patient, agree with the workup, evaluation, management and diagnosis. The care plan has been discussed.  I have reviewed the ECG and concur with the resident's interpretation.  My assessment reveals a nontoxic-appearing 34-year-old female presenting with chief complaint of epigastric pain.  Patient reports history of heavy alcohol use, 3-5 drinks daily.  Last drink 2 days ago.  Patient reports no nausea or vomiting.  No dysuria, hematuria, urinary frequency, diarrhea or constipation.  On examination she has mild epigastric tenderness without rebound or guarding.         Los Stevens MD  07/22/25 1768

## 2025-07-22 NOTE — DISCHARGE INSTRUCTIONS
You were seen here today for abdominal pain.  We gave you medications that made you feel better.  Often times we cannot pinpoint exactly what is causing your pain.  We did labs and imaging here that did not show any specific cause of your pain but also did not show any dangerous causes at this time.  If you have any new or worsening symptoms, do not hesitate to return to the emergency department.  Additionally, if you have any worsening withdrawal symptoms at home you may return to the emergency department for treatment.    You should return to the emergency department if your symptoms worsen or do not resolve. In addition, return if:  - You have a fever (greater than 101 degrees)  - You have chest pain, shortness of breath, abdominal pain, or uncontrollable vomiting  - You are unable to eat or drink  - You pass out  - You have difficulty moving your arms or legs   - You have difficulty speaking or slurred speech  - Or you have any concern that you feel needs acute physician evaluation.

## 2025-07-22 NOTE — ED PROVIDER NOTES
[facility]  EMERGENCY DEPARTMENT ENCOUNTER          EM RESIDENT NOTE       Date of evaluation: 7/22/2025    Chief Complaint     Chest Pain (Pt presents to the ED from work d/t chest pain in sternal area that started about 30 min ago. Describes as a sharp twisting pain. ) and Abdominal Pain      History of Present Illness     Kasia Raya is a 34 y.o. female who presents with epigastric/chest pain.  Patient notes experiencing sudden onset of mid epigastric/left upper abdominal and chest pain just before coming in.  She denies associated shortness of breath but does state that the pain is pleuritic in nature.  She has not had any nausea, vomiting, diarrhea, fever.  She does note a history of drinking through 2-3 beers per day and her last drink was approximately 2 days ago.  She has never had withdrawal symptoms including seizures or DTs.  She is also a daily marijuana user.    MEDICAL DECISION MAKING / ASSESSMENT / PLAN     INITIAL VITALS: BP: (!) 162/97, Temp: 97.1 °F (36.2 °C), Pulse: 99, Respirations: 17, SpO2: 98 %    Kasia Raya is a 34 y.o. female presenting with epigastric abdominal pain.  On initial evaluation, patient was very uncomfortable appearing, writhing around in the bed and holding her stomach.  She was mildly hypertensive but otherwise without tachycardia or fever.  She did not have any other objective or subjective signs of alcohol withdrawal at this time and overall without a history is fairly low risk.  Given her abdominal tenderness pursue lab workup as well as cross-sectional imaging.  Her work was overall unremarkable without any signs of infection, biliary obstruction, elevated lipase, or cardiac ischemia.  Her D-dimer was negative which is reassuring against PE.  Her chest x-ray was normal.  Her CT abdomen pelvis was notable for a small fat-containing hernia but otherwise no acute findings.  She did not have it convincing UTI on UA.  She felt significantly better

## 2025-07-22 NOTE — ED NOTES
Pt statees she is feeling lightheaded and \"weird\". Will inform Dr. Stevens.      Geoffrey Edouard RN  07/22/25 1200

## (undated) DEVICE — SUTURE ABSORBABLE BRAIDED 2-0 CT-1 27 IN UD VICRYL J259H

## (undated) DEVICE — SUTURE VCRL SZ 0 L36IN ABSRB UD L36MM CT-1 1/2 CIR J946H

## (undated) DEVICE — SOLUTION IV IRRIG POUR BRL 0.9% SODIUM CHL 2F7124

## (undated) DEVICE — Device

## (undated) DEVICE — 3M™ STERI-STRIP™ COMPOUND BENZOIN TINCTURE 40 BAGS/CARTON 4 CARTONS/CASE C1544: Brand: 3M™ STERI-STRIP™

## (undated) DEVICE — TRAY URIN CATH 16FR DRNGE BG STATLOK STBL DEV F SURSTP

## (undated) DEVICE — SUTURE VCRL SZ 0 L36IN ABSRB UD L48MM CTX 1/2 CIR J978H

## (undated) DEVICE — DRESSING COMP IS W4XL10IN PD W2XL8IN CNTCT LAYR ADH

## (undated) DEVICE — CHLORAPREP 26ML ORANGE

## (undated) DEVICE — GARMENT COMPR L FOR 23IN CALF FLOTRN

## (undated) DEVICE — GLOVE SURG SZ 6 THK91MIL LTX FREE SYN POLYISOPRENE ANTI

## (undated) DEVICE — LARGE, DISPOSABLE ALEXIS O C-SECTION PROTECTOR - RETRACTOR: Brand: ALEXIS ® O C-SECTION PROTECTOR - RETRACTOR

## (undated) DEVICE — SUTURE VCRL SZ 3-0 L36IN ABSRB UD L36MM CT-1 1/2 CIR J944H

## (undated) DEVICE — S/USE RESUS KIT W/O MASK (10): Brand: FISHER & PAYKEL HEALTHCARE

## (undated) DEVICE — PAD,NON-ADHERENT,3X8,STERILE,LF,1/PK: Brand: MEDLINE